# Patient Record
Sex: MALE | Race: BLACK OR AFRICAN AMERICAN | NOT HISPANIC OR LATINO | URBAN - METROPOLITAN AREA
[De-identification: names, ages, dates, MRNs, and addresses within clinical notes are randomized per-mention and may not be internally consistent; named-entity substitution may affect disease eponyms.]

---

## 2017-09-24 ENCOUNTER — INPATIENT (INPATIENT)
Facility: HOSPITAL | Age: 82
LOS: 2 days | Discharge: NURSING, REHAB OR LONG-TERM CARE | DRG: 480 | End: 2017-09-27
Attending: ORTHOPAEDIC SURGERY | Admitting: ORTHOPAEDIC SURGERY
Payer: MEDICARE

## 2017-09-24 VITALS
HEART RATE: 53 BPM | RESPIRATION RATE: 18 BRPM | SYSTOLIC BLOOD PRESSURE: 109 MMHG | OXYGEN SATURATION: 100 % | DIASTOLIC BLOOD PRESSURE: 61 MMHG | TEMPERATURE: 97 F

## 2017-09-24 DIAGNOSIS — Z01.818 ENCOUNTER FOR OTHER PREPROCEDURAL EXAMINATION: ICD-10-CM

## 2017-09-24 LAB
ALBUMIN SERPL ELPH-MCNC: 3.9 G/DL — SIGNIFICANT CHANGE UP (ref 3.3–5)
ALP SERPL-CCNC: 102 U/L — SIGNIFICANT CHANGE UP (ref 40–120)
ALT FLD-CCNC: 12 U/L — SIGNIFICANT CHANGE UP (ref 10–45)
ANION GAP SERPL CALC-SCNC: 15 MMOL/L — SIGNIFICANT CHANGE UP (ref 5–17)
APPEARANCE UR: CLEAR — SIGNIFICANT CHANGE UP
APTT BLD: 29.9 SEC — SIGNIFICANT CHANGE UP (ref 27.5–37.4)
AST SERPL-CCNC: 20 U/L — SIGNIFICANT CHANGE UP (ref 10–40)
BASE EXCESS BLDA CALC-SCNC: -1.4 MMOL/L — SIGNIFICANT CHANGE UP (ref -2–3)
BASOPHILS NFR BLD AUTO: 0.1 % — SIGNIFICANT CHANGE UP (ref 0–2)
BILIRUB SERPL-MCNC: 0.4 MG/DL — SIGNIFICANT CHANGE UP (ref 0.2–1.2)
BILIRUB UR-MCNC: NEGATIVE — SIGNIFICANT CHANGE UP
BLD GP AB SCN SERPL QL: NEGATIVE — SIGNIFICANT CHANGE UP
BUN SERPL-MCNC: 23 MG/DL — SIGNIFICANT CHANGE UP (ref 7–23)
CA-I BLDA-SCNC: 1.15 MMOL/L — SIGNIFICANT CHANGE UP (ref 1.12–1.3)
CALCIUM SERPL-MCNC: 9.5 MG/DL — SIGNIFICANT CHANGE UP (ref 8.4–10.5)
CHLORIDE SERPL-SCNC: 102 MMOL/L — SIGNIFICANT CHANGE UP (ref 96–108)
CO2 SERPL-SCNC: 22 MMOL/L — SIGNIFICANT CHANGE UP (ref 22–31)
COHGB MFR BLDA: 0.3 % — SIGNIFICANT CHANGE UP
COLOR SPEC: YELLOW — SIGNIFICANT CHANGE UP
CREAT SERPL-MCNC: 0.97 MG/DL — SIGNIFICANT CHANGE UP (ref 0.5–1.3)
DIFF PNL FLD: NEGATIVE — SIGNIFICANT CHANGE UP
EOSINOPHIL NFR BLD AUTO: 1.6 % — SIGNIFICANT CHANGE UP (ref 0–6)
GAS PNL BLDA: SIGNIFICANT CHANGE UP
GLUCOSE SERPL-MCNC: 142 MG/DL — HIGH (ref 70–99)
GLUCOSE UR QL: NEGATIVE — SIGNIFICANT CHANGE UP
HCO3 BLDA-SCNC: 23 MMOL/L — SIGNIFICANT CHANGE UP (ref 21–28)
HCT VFR BLD CALC: 32.5 % — LOW (ref 39–50)
HGB BLD-MCNC: 10.6 G/DL — LOW (ref 13–17)
HGB BLDA-MCNC: 10.6 G/DL — LOW (ref 13–17)
INR BLD: 1.12 — SIGNIFICANT CHANGE UP (ref 0.88–1.16)
KETONES UR-MCNC: (no result) MG/DL
LEUKOCYTE ESTERASE UR-ACNC: NEGATIVE — SIGNIFICANT CHANGE UP
LYMPHOCYTES # BLD AUTO: 13.6 % — SIGNIFICANT CHANGE UP (ref 13–44)
MCHC RBC-ENTMCNC: 29.1 PG — SIGNIFICANT CHANGE UP (ref 27–34)
MCHC RBC-ENTMCNC: 32.6 G/DL — SIGNIFICANT CHANGE UP (ref 32–36)
MCV RBC AUTO: 89.3 FL — SIGNIFICANT CHANGE UP (ref 80–100)
METHGB MFR BLDA: 0.2 % — SIGNIFICANT CHANGE UP
MONOCYTES NFR BLD AUTO: 11.2 % — SIGNIFICANT CHANGE UP (ref 2–14)
MRSA PCR RESULT.: NEGATIVE — SIGNIFICANT CHANGE UP
NEUTROPHILS NFR BLD AUTO: 73.5 % — SIGNIFICANT CHANGE UP (ref 43–77)
NITRITE UR-MCNC: NEGATIVE — SIGNIFICANT CHANGE UP
O2 CT VFR BLDA CALC: SIGNIFICANT CHANGE UP (ref 15–23)
OXYHGB MFR BLDA: 100 % — SIGNIFICANT CHANGE UP (ref 94–100)
PCO2 BLDA: 37 MMHG — SIGNIFICANT CHANGE UP (ref 35–48)
PH BLDA: 7.41 — SIGNIFICANT CHANGE UP (ref 7.35–7.45)
PH UR: 6 — SIGNIFICANT CHANGE UP (ref 5–8)
PLATELET # BLD AUTO: 173 K/UL — SIGNIFICANT CHANGE UP (ref 150–400)
PO2 BLDA: 441 MMHG — HIGH (ref 83–108)
POTASSIUM BLDA-SCNC: 4.2 MMOL/L — SIGNIFICANT CHANGE UP (ref 3.5–4.9)
POTASSIUM SERPL-MCNC: 4.6 MMOL/L — SIGNIFICANT CHANGE UP (ref 3.5–5.3)
POTASSIUM SERPL-SCNC: 4.6 MMOL/L — SIGNIFICANT CHANGE UP (ref 3.5–5.3)
PROT SERPL-MCNC: 7.7 G/DL — SIGNIFICANT CHANGE UP (ref 6–8.3)
PROT UR-MCNC: NEGATIVE MG/DL — SIGNIFICANT CHANGE UP
PROTHROM AB SERPL-ACNC: 12.5 SEC — SIGNIFICANT CHANGE UP (ref 9.8–12.7)
RBC # BLD: 3.64 M/UL — LOW (ref 4.2–5.8)
RBC # FLD: 14.1 % — SIGNIFICANT CHANGE UP (ref 10.3–16.9)
RH IG SCN BLD-IMP: POSITIVE — SIGNIFICANT CHANGE UP
S AUREUS DNA NOSE QL NAA+PROBE: NEGATIVE — SIGNIFICANT CHANGE UP
SAO2 % BLDA: 100 % — SIGNIFICANT CHANGE UP (ref 95–100)
SODIUM BLDA-SCNC: 138 MMOL/L — SIGNIFICANT CHANGE UP (ref 138–146)
SODIUM SERPL-SCNC: 139 MMOL/L — SIGNIFICANT CHANGE UP (ref 135–145)
SP GR SPEC: 1.01 — SIGNIFICANT CHANGE UP (ref 1–1.03)
UROBILINOGEN FLD QL: 0.2 E.U./DL — SIGNIFICANT CHANGE UP
WBC # BLD: 7.6 K/UL — SIGNIFICANT CHANGE UP (ref 3.8–10.5)
WBC # FLD AUTO: 7.6 K/UL — SIGNIFICANT CHANGE UP (ref 3.8–10.5)

## 2017-09-24 PROCEDURE — 93010 ELECTROCARDIOGRAM REPORT: CPT

## 2017-09-24 PROCEDURE — 99222 1ST HOSP IP/OBS MODERATE 55: CPT

## 2017-09-24 PROCEDURE — 73552 X-RAY EXAM OF FEMUR 2/>: CPT | Mod: 26,76,RT

## 2017-09-24 PROCEDURE — 71010: CPT | Mod: 26

## 2017-09-24 PROCEDURE — 73503 X-RAY EXAM HIP UNI 4/> VIEWS: CPT | Mod: 26,RT

## 2017-09-24 PROCEDURE — 99285 EMERGENCY DEPT VISIT HI MDM: CPT | Mod: 25

## 2017-09-24 RX ORDER — SODIUM CHLORIDE 9 MG/ML
1000 INJECTION INTRAMUSCULAR; INTRAVENOUS; SUBCUTANEOUS ONCE
Qty: 0 | Refills: 0 | Status: COMPLETED | OUTPATIENT
Start: 2017-09-24 | End: 2017-09-24

## 2017-09-24 RX ORDER — SODIUM CHLORIDE 9 MG/ML
1000 INJECTION, SOLUTION INTRAVENOUS
Qty: 0 | Refills: 0 | Status: DISCONTINUED | OUTPATIENT
Start: 2017-09-24 | End: 2017-09-27

## 2017-09-24 RX ORDER — OXYCODONE HYDROCHLORIDE 5 MG/1
5 TABLET ORAL EVERY 4 HOURS
Qty: 0 | Refills: 0 | Status: DISCONTINUED | OUTPATIENT
Start: 2017-09-24 | End: 2017-09-26

## 2017-09-24 RX ORDER — DOCUSATE SODIUM 100 MG
100 CAPSULE ORAL THREE TIMES A DAY
Qty: 0 | Refills: 0 | Status: DISCONTINUED | OUTPATIENT
Start: 2017-09-24 | End: 2017-09-27

## 2017-09-24 RX ORDER — ATENOLOL 25 MG/1
50 TABLET ORAL DAILY
Qty: 0 | Refills: 0 | Status: DISCONTINUED | OUTPATIENT
Start: 2017-09-24 | End: 2017-09-27

## 2017-09-24 RX ORDER — OXYCODONE HYDROCHLORIDE 5 MG/1
10 TABLET ORAL EVERY 4 HOURS
Qty: 0 | Refills: 0 | Status: DISCONTINUED | OUTPATIENT
Start: 2017-09-24 | End: 2017-09-26

## 2017-09-24 RX ORDER — SENNA PLUS 8.6 MG/1
2 TABLET ORAL AT BEDTIME
Qty: 0 | Refills: 0 | Status: DISCONTINUED | OUTPATIENT
Start: 2017-09-24 | End: 2017-09-27

## 2017-09-24 RX ORDER — ACETAMINOPHEN 500 MG
650 TABLET ORAL EVERY 6 HOURS
Qty: 0 | Refills: 0 | Status: DISCONTINUED | OUTPATIENT
Start: 2017-09-24 | End: 2017-09-27

## 2017-09-24 RX ORDER — CEFAZOLIN SODIUM 1 G
2000 VIAL (EA) INJECTION EVERY 8 HOURS
Qty: 0 | Refills: 0 | Status: COMPLETED | OUTPATIENT
Start: 2017-09-25 | End: 2017-09-25

## 2017-09-24 RX ORDER — INFLUENZA VIRUS VACCINE 15; 15; 15; 15 UG/.5ML; UG/.5ML; UG/.5ML; UG/.5ML
0.5 SUSPENSION INTRAMUSCULAR ONCE
Qty: 0 | Refills: 0 | Status: DISCONTINUED | OUTPATIENT
Start: 2017-09-24 | End: 2017-09-27

## 2017-09-24 RX ORDER — DOCUSATE SODIUM 100 MG
100 CAPSULE ORAL THREE TIMES A DAY
Qty: 0 | Refills: 0 | Status: DISCONTINUED | OUTPATIENT
Start: 2017-09-24 | End: 2017-09-24

## 2017-09-24 RX ORDER — ONDANSETRON 8 MG/1
4 TABLET, FILM COATED ORAL EVERY 6 HOURS
Qty: 0 | Refills: 0 | Status: DISCONTINUED | OUTPATIENT
Start: 2017-09-24 | End: 2017-09-27

## 2017-09-24 RX ORDER — POLYETHYLENE GLYCOL 3350 17 G/17G
17 POWDER, FOR SOLUTION ORAL DAILY
Qty: 0 | Refills: 0 | Status: DISCONTINUED | OUTPATIENT
Start: 2017-09-24 | End: 2017-09-27

## 2017-09-24 RX ORDER — ASPIRIN/CALCIUM CARB/MAGNESIUM 324 MG
325 TABLET ORAL
Qty: 0 | Refills: 0 | Status: DISCONTINUED | OUTPATIENT
Start: 2017-09-24 | End: 2017-09-27

## 2017-09-24 RX ORDER — FLUOXETINE HCL 10 MG
20 CAPSULE ORAL DAILY
Qty: 0 | Refills: 0 | Status: DISCONTINUED | OUTPATIENT
Start: 2017-09-24 | End: 2017-09-27

## 2017-09-24 RX ORDER — FERROUS SULFATE 325(65) MG
325 TABLET ORAL
Qty: 0 | Refills: 0 | Status: DISCONTINUED | OUTPATIENT
Start: 2017-09-24 | End: 2017-09-27

## 2017-09-24 RX ORDER — MAGNESIUM HYDROXIDE 400 MG/1
30 TABLET, CHEWABLE ORAL DAILY
Qty: 0 | Refills: 0 | Status: DISCONTINUED | OUTPATIENT
Start: 2017-09-24 | End: 2017-09-27

## 2017-09-24 RX ORDER — INFLUENZA VIRUS VACCINE 15; 15; 15; 15 UG/.5ML; UG/.5ML; UG/.5ML; UG/.5ML
0.5 SUSPENSION INTRAMUSCULAR ONCE
Qty: 0 | Refills: 0 | Status: DISCONTINUED | OUTPATIENT
Start: 2017-09-24 | End: 2017-09-24

## 2017-09-24 RX ORDER — MORPHINE SULFATE 50 MG/1
2 CAPSULE, EXTENDED RELEASE ORAL ONCE
Qty: 0 | Refills: 0 | Status: DISCONTINUED | OUTPATIENT
Start: 2017-09-24 | End: 2017-09-24

## 2017-09-24 RX ORDER — SODIUM CHLORIDE 9 MG/ML
1000 INJECTION, SOLUTION INTRAVENOUS
Qty: 0 | Refills: 0 | Status: DISCONTINUED | OUTPATIENT
Start: 2017-09-24 | End: 2017-09-24

## 2017-09-24 RX ORDER — MORPHINE SULFATE 50 MG/1
4 CAPSULE, EXTENDED RELEASE ORAL EVERY 4 HOURS
Qty: 0 | Refills: 0 | Status: DISCONTINUED | OUTPATIENT
Start: 2017-09-24 | End: 2017-09-26

## 2017-09-24 RX ORDER — ACETAMINOPHEN 500 MG
650 TABLET ORAL EVERY 6 HOURS
Qty: 0 | Refills: 0 | Status: DISCONTINUED | OUTPATIENT
Start: 2017-09-24 | End: 2017-09-24

## 2017-09-24 RX ADMIN — MORPHINE SULFATE 2 MILLIGRAM(S): 50 CAPSULE, EXTENDED RELEASE ORAL at 08:36

## 2017-09-24 RX ADMIN — SODIUM CHLORIDE 1000 MILLILITER(S): 9 INJECTION INTRAMUSCULAR; INTRAVENOUS; SUBCUTANEOUS at 08:35

## 2017-09-24 NOTE — ED ADULT NURSE NOTE - CHPI ED SYMPTOMS NEG
no loss of consciousness/no bleeding/no abrasion/no confusion/no tingling/no weakness/no deformity/no fever/no vomiting/no numbness

## 2017-09-24 NOTE — H&P ADULT - HISTORY OF PRESENT ILLNESS
87M PMH dementia and HTN BIBA from [facility] with R hip pain after being found down. Staff did not witness fall but reports he could have fallen out of bed onto R side, while pt does not remember falling. C/o R hip and knee pain. Unable to walk/bear weight.  Denies other trauma, loc, numbness/weakness.

## 2017-09-24 NOTE — CONSULT NOTE ADULT - PROBLEM SELECTOR RECOMMENDATION 9
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  No ischemic changes  - No CP/SOB, CXR clear lungs  - No known CAD per HCP  - RCRI score 0.    - no further cardiac workup needed at this point prior to moderate risk urgent surgery   - case d/w Dr. Villanueva

## 2017-09-24 NOTE — ED PROVIDER NOTE - MEDICAL DECISION MAKING DETAILS
pt with hip fracture s/p mechanical fall, no evidence of other injury, preop labs in ED and pt admitted to ortho

## 2017-09-24 NOTE — CONSULT NOTE ADULT - ATTENDING COMMENTS
Agree w Consult Note, Referral/Consultation, Subjective and Objective, Assessment and Recommendation

## 2017-09-24 NOTE — ED PROVIDER NOTE - ATTESTATION, MLM
I have reviewed and confirmed nurses' notes for patient's medications, allergies, medical history, and surgical history.
Improved

## 2017-09-24 NOTE — ED PROVIDER NOTE - OBJECTIVE STATEMENT
86 y/o m with PMH of dementia, HTN presents to ED from [facility] for fall.  As per tx sheet, pt found at bedside this morning and possibly fell out of bed.  Pt states he was walking and fell.  However, pt with hx of dementia and unreliable.  Pt complaining of right hip pain only.  No head trauma or other complaints.  Unable to ambulate.

## 2017-09-24 NOTE — BRIEF OPERATIVE NOTE - PROCEDURE
<<-----Click on this checkbox to enter Procedure Intramedullary nailing for fracture of right femur  09/24/2017    Active  NATASHA

## 2017-09-24 NOTE — PACU DISCHARGE NOTE - COMMENTS
Report given to Lizet LAMA VSS,. Dressing dry and intact to hip and calf. Xray done. Pt resting comfortably.

## 2017-09-24 NOTE — H&P ADULT - NSHPPHYSICALEXAM_GEN_ALL_CORE
NAD, lying comfortably in stretcher, AOx1 (unsure of time/place)  RLE: shortened, externally rotated, +logroll, TTP over greater troch  Motor: 5/5 EHL/FHL/GS/TA  Sensory: SILT  Pulses wwp DP/PT 2+

## 2017-09-24 NOTE — ED ADULT NURSE NOTE - OBJECTIVE STATEMENT
s/p fall while walking, c/o right hip/knee pain, denies LOC, states he does not use walker/cane, states he was walking alone on the sidewalk and fell, unk reason BIBA from [facility], s/p fall while walking, c/o right hip/knee pain, denies LOC, states he does not use walker/cane, states he was walking outside the hosp/home, alone on the sidewalk and fell, unk reason, denies dizziness

## 2017-09-24 NOTE — ED PROVIDER NOTE - MUSCULOSKELETAL, MLM
right lower ext - contracted, shortened, rotated, no knee or ankle tenderness, no distal nv deficit, left lower ext wnl

## 2017-09-24 NOTE — ED ADULT NURSE REASSESSMENT NOTE - NS ED NURSE REASSESS COMMENT FT1
Received patient at the change of shift, patient awake, alert to self and place. Patient complaining of right hip pain. Pending xray.

## 2017-09-24 NOTE — H&P ADULT - ASSESSMENT
A/P  Pt 87yMale  s/p fall with R IT fx  Admit to Orthopaedics  NPO   IVF  Pain control  Hold anticoagulation for OR  Labs  UA  type and screen  CXR   EKG  Clearance with Dr. Villanueva  Consented for R IMN over phone with HCP (daughter, Evelyn Vital- [phone]) 2 physician consent (Dr. Santo)  d/w attending

## 2017-09-24 NOTE — H&P ADULT - NSHPLABSRESULTS_GEN_ALL_CORE
Vital Signs Last 24 Hrs  T(C): 36.2 (24 Sep 2017 10:45), Max: 36.6 (24 Sep 2017 08:40)  T(F): 97.2 (24 Sep 2017 10:45), Max: 97.9 (24 Sep 2017 08:40)  HR: 64 (24 Sep 2017 10:45) (53 - 64)  BP: 122/66 (24 Sep 2017 10:45) (101/62 - 122/66)  BP(mean): --  RR: 18 (24 Sep 2017 10:45) (16 - 18)  SpO2: 98% (24 Sep 2017 10:45) (97% - 100%)                          10.6   7.6   )-----------( 173      ( 24 Sep 2017 08:06 )             32.5     09-24    139  |  102  |  23  ----------------------------<  142<H>  4.6   |  22  |  0.97    Ca    9.5      24 Sep 2017 08:06    TPro  7.7  /  Alb  3.9  /  TBili  0.4  /  DBili  x   /  AST  20  /  ALT  12  /  AlkPhos  102  09-24    PT/INR - ( 24 Sep 2017 08:06 )   PT: 12.5 sec;   INR: 1.12          PTT - ( 24 Sep 2017 08:06 )  PTT:29.9 sec    Type + Screen (09.24.17 @ 07:58)    ABO Interpretation: A    Rh Interpretation: Positive    Antibody Screen: Negative

## 2017-09-24 NOTE — PATIENT PROFILE ADULT. - NS PRO INDICAT FLU
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

## 2017-09-24 NOTE — ED ADULT TRIAGE NOTE - ARRIVAL INFO ADDITIONAL COMMENTS
as per ems, pt was found on the floor in nursing home. pt c.o right hip and right knee pain. pt denies any numbness or tingling to right leg. denies any headache, denies any loc. hx of dementia. as per ems, no head injury or loc. as per ems, pt was found on the floor in nursing home. pt c.o right hip and right knee pain. pts right knee noted to be bent, refused to straighten out leg due to pain. pt denies any numbness or tingling to right leg. denies any headache, denies any loc. hx of dementia. as per ems, no head injury or loc.

## 2017-09-25 LAB
ANION GAP SERPL CALC-SCNC: 11 MMOL/L — SIGNIFICANT CHANGE UP (ref 5–17)
BASOPHILS NFR BLD AUTO: 0.1 % — SIGNIFICANT CHANGE UP (ref 0–2)
BLD GP AB SCN SERPL QL: NEGATIVE — SIGNIFICANT CHANGE UP
BUN SERPL-MCNC: 20 MG/DL — SIGNIFICANT CHANGE UP (ref 7–23)
CALCIUM SERPL-MCNC: 8.7 MG/DL — SIGNIFICANT CHANGE UP (ref 8.4–10.5)
CHLORIDE SERPL-SCNC: 103 MMOL/L — SIGNIFICANT CHANGE UP (ref 96–108)
CO2 SERPL-SCNC: 25 MMOL/L — SIGNIFICANT CHANGE UP (ref 22–31)
CREAT SERPL-MCNC: 0.95 MG/DL — SIGNIFICANT CHANGE UP (ref 0.5–1.3)
EOSINOPHIL NFR BLD AUTO: 0.2 % — SIGNIFICANT CHANGE UP (ref 0–6)
GLUCOSE SERPL-MCNC: 148 MG/DL — HIGH (ref 70–99)
HCT VFR BLD CALC: 21.9 % — LOW (ref 39–50)
HGB BLD-MCNC: 7.2 G/DL — LOW (ref 13–17)
LYMPHOCYTES # BLD AUTO: 10.6 % — LOW (ref 13–44)
MCHC RBC-ENTMCNC: 30 PG — SIGNIFICANT CHANGE UP (ref 27–34)
MCHC RBC-ENTMCNC: 32.9 G/DL — SIGNIFICANT CHANGE UP (ref 32–36)
MCV RBC AUTO: 91.3 FL — SIGNIFICANT CHANGE UP (ref 80–100)
MONOCYTES NFR BLD AUTO: 17.3 % — HIGH (ref 2–14)
NEUTROPHILS NFR BLD AUTO: 71.8 % — SIGNIFICANT CHANGE UP (ref 43–77)
PLATELET # BLD AUTO: 139 K/UL — LOW (ref 150–400)
POTASSIUM SERPL-MCNC: 5 MMOL/L — SIGNIFICANT CHANGE UP (ref 3.5–5.3)
POTASSIUM SERPL-SCNC: 5 MMOL/L — SIGNIFICANT CHANGE UP (ref 3.5–5.3)
RBC # BLD: 2.4 M/UL — LOW (ref 4.2–5.8)
RBC # FLD: 14 % — SIGNIFICANT CHANGE UP (ref 10.3–16.9)
RH IG SCN BLD-IMP: POSITIVE — SIGNIFICANT CHANGE UP
SODIUM SERPL-SCNC: 139 MMOL/L — SIGNIFICANT CHANGE UP (ref 135–145)
WBC # BLD: 10.1 K/UL — SIGNIFICANT CHANGE UP (ref 3.8–10.5)
WBC # FLD AUTO: 10.1 K/UL — SIGNIFICANT CHANGE UP (ref 3.8–10.5)

## 2017-09-25 PROCEDURE — 99232 SBSQ HOSP IP/OBS MODERATE 35: CPT

## 2017-09-25 RX ADMIN — Medication 325 MILLIGRAM(S): at 17:00

## 2017-09-25 RX ADMIN — Medication 100 MILLIGRAM(S): at 05:45

## 2017-09-25 RX ADMIN — OXYCODONE HYDROCHLORIDE 5 MILLIGRAM(S): 5 TABLET ORAL at 06:45

## 2017-09-25 RX ADMIN — ATENOLOL 50 MILLIGRAM(S): 25 TABLET ORAL at 05:44

## 2017-09-25 RX ADMIN — OXYCODONE HYDROCHLORIDE 5 MILLIGRAM(S): 5 TABLET ORAL at 17:00

## 2017-09-25 RX ADMIN — OXYCODONE HYDROCHLORIDE 5 MILLIGRAM(S): 5 TABLET ORAL at 06:04

## 2017-09-25 RX ADMIN — Medication 325 MILLIGRAM(S): at 05:45

## 2017-09-25 RX ADMIN — Medication 100 MILLIGRAM(S): at 21:32

## 2017-09-25 RX ADMIN — Medication 650 MILLIGRAM(S): at 17:00

## 2017-09-25 RX ADMIN — Medication 650 MILLIGRAM(S): at 05:45

## 2017-09-25 RX ADMIN — Medication 325 MILLIGRAM(S): at 07:34

## 2017-09-25 RX ADMIN — Medication 100 MILLIGRAM(S): at 04:47

## 2017-09-25 RX ADMIN — Medication 325 MILLIGRAM(S): at 11:59

## 2017-09-25 RX ADMIN — POLYETHYLENE GLYCOL 3350 17 GRAM(S): 17 POWDER, FOR SOLUTION ORAL at 12:00

## 2017-09-25 RX ADMIN — Medication 20 MILLIGRAM(S): at 12:00

## 2017-09-25 RX ADMIN — Medication 100 MILLIGRAM(S): at 12:10

## 2017-09-25 RX ADMIN — Medication 650 MILLIGRAM(S): at 11:59

## 2017-09-25 NOTE — CHART NOTE - NSCHARTNOTEFT_GEN_A_CORE
Upon Nutritional Assessment by the Registered Dietitian your patient was determined to meet criteria / has evidence of the following diagnosis/diagnoses:          [ ]  Mild Protein Calorie Malnutrition        [ ]  Moderate Protein Calorie Malnutrition        [X] Severe Protein Calorie Malnutrition        [ ] Unspecified Protein Calorie Malnutrition        [ ] Underweight / BMI <19        [ ] Morbid Obesity / BMI > 40      Findings as based on:  •  Comprehensive nutrition assessment and consultation  •  Calorie counts (nutrient intake analysis)  •  Food acceptance and intake status from observations by staff  •  Follow up  •  Patient education  •  Intervention secondary to interdisciplinary rounds  •   concerns      Treatment:    The following diet has been recommended:  Mechanical soft diet + ensure enlive TID  MVI    PROVIDER Section:     By signing this assessment you are acknowledging and agree with the diagnosis/diagnoses assigned by the Registered Dietitian    Comments:

## 2017-09-25 NOTE — PHYSICAL THERAPY INITIAL EVALUATION ADULT - ADDITIONAL COMMENTS
Prior level of function is unclear at this time. Pt is from skilled nursing facility, where he has been a resident for ~ 3months. Pt states he has been ambulating but pt is a poor historian due to dementia.

## 2017-09-25 NOTE — DIETITIAN INITIAL EVALUATION ADULT. - ENERGY NEEDS
IBW 80.9Kg  %IBW 78%  BMI 19    Utilized IBW to calculate needs, <80% of IBW. Adjusting for post-op and need for repletion.

## 2017-09-25 NOTE — PROGRESS NOTE ADULT - SUBJECTIVE AND OBJECTIVE BOX
ORTHO NOTE    [x ] Pt seen/examined.  [x ] Pt without any complaints/in NAD.    [ ] Pt complains of:      ROS: [ ] Fever  [ ] Chills  [ ] CP [ ] SOB [ ] Dysnea  [ ] Palpitations [ ] Cough [ ] N/V/C/D [ ] Paresthia [ ] Other     [x ] ROS  otherwise negative    .    PHYSICAL EXAM:    Vital Signs Last 24 Hrs  T(C): 37.1 (25 Sep 2017 15:18), Max: 37.1 (24 Sep 2017 22:40)  T(F): 98.8 (25 Sep 2017 15:18), Max: 98.8 (25 Sep 2017 15:18)  HR: 72 (25 Sep 2017 15:30) (72 - 98)  BP: 120/74 (25 Sep 2017 15:30) (96/55 - 131/62)  BP(mean): 76 (24 Sep 2017 23:00) (72 - 100)  RR: 15 (25 Sep 2017 15:18) (12 - 24)  SpO2: 98% (25 Sep 2017 15:18) (95% - 100%)    I&O's Detail    24 Sep 2017 07:01  -  25 Sep 2017 07:00  --------------------------------------------------------  IN:    IV PiggyBack: 50 mL    Lactated Ringers IV Bolus: 2000 mL    lactated ringers.: 900 mL  Total IN: 2950 mL    OUT:    Estimated Blood Loss: 300 mL    Indwelling Catheter - Urethral: 900 mL    Voided: 75 mL  Total OUT: 1275 mL    Total NET: 1675 mL      25 Sep 2017 07:01  -  25 Sep 2017 15:42  --------------------------------------------------------  IN:  Total IN: 0 mL    OUT:    Voided: 375 mL  Total OUT: 375 mL    Total NET: -375 mL           CAPILLARY BLOOD GLUCOSE                      Neuro: AAOx1, reoriented to place and time    Lungs: CTA, CXR clear    CV:     ABD: soft, nontender     Ext: right hip dsg cdi, R LE NVID strength 5/5    LABS                        7.2    10.1  )-----------( 139      ( 25 Sep 2017 08:48 )             21.9                              PT/INR - ( 24 Sep 2017 08:06 )   PT: 12.5 sec;   INR: 1.12          PTT - ( 24 Sep 2017 08:06 )  PTT:29.9 sec  09-25    139  |  103  |  20  ----------------------------<  148<H>  5.0   |  25  |  0.95    Ca    8.7      25 Sep 2017 08:48    TPro  7.7  /  Alb  3.9  /  TBili  0.4  /  DBili  x   /  AST  20  /  ALT  12  /  AlkPhos  102  09-24      [ ] Other Labs  [ ] None ordered            Please check or [place] when present:  •  Heart Failure:    [ ] Acute        [ ]  Acute on Chronic        [ ] Chronic         [ ] Diastolic     [ ]  Combined    •  CONNER:     [ ] ATN        [ ]  Renal medullary necrosis       [ ]  Renal cortical necrosis                  [ ] Other pathological Lesion:  •  CKD:  [ ] Stage I   [ ] Stage II  [ ] Stage III    [ ]Stage IV   [ ]  CKD V   [ ]  Other/Unspecified:    •  Abdominal Nutritional Status:   [ ] Malnutrition-See Nutrition note    [ ] Cachexia   [ ]  Other        [ ] Supplement ordered:            [ ] Morbid Obesity: BMI >=40         ASSESSMENT/PLAN:      STATUS POST: R hip IM Nail pod1  H/H 7.2/21.9. d/w cards Dr. Villanueva and Dr. Barnard. 1UPRB for acute blood loss anemia secondary to surgery  CONTINUE:          [ ] PT- WBAT    [ ] DVT PPX- scd, ASA bid    [ ] Pain Mgt- po meds (limit narcotics)     [ ] Dispo plan- [facility] (patient lives at [facility])

## 2017-09-25 NOTE — PROGRESS NOTE ADULT - SUBJECTIVE AND OBJECTIVE BOX
Patient seen and examined at bedside.      S:  Required glynn overnight.   Pain tolerable.  Remains in bed.  Denies CP/SOB. Tolerating PO.  ROS unremarkable.    O: T(C): 36.8 (09-25-17 @ 08:39), Max: 37.1 (09-24-17 @ 13:15)  HR: 73 (09-25-17 @ 08:39) (72 - 98)  BP: 98/58 (09-25-17 @ 08:39) (96/55 - 131/62)  RR: 17 (09-25-17 @ 08:39) (12 - 24)  SpO2: 96% (09-25-17 @ 08:39) (95% - 100%)  Wt(kg): --    NAD, AOx3, non-labored respirations  Abd soft, NTND  Dressing CDI  Extremity soft, appropriate swelling and minimally TTP  foot warm and well perfused  SILT dP, sP, Sa, Aquino, T  firing TA/EHL/GS                          7.2    10.1  )-----------( 139      ( 25 Sep 2017 08:48 )             21.9       I&O's Detail    24 Sep 2017 07:01  -  25 Sep 2017 07:00  --------------------------------------------------------  IN:    IV PiggyBack: 50 mL    Lactated Ringers IV Bolus: 2000 mL    lactated ringers.: 900 mL  Total IN: 2950 mL    OUT:    Estimated Blood Loss: 300 mL    Indwelling Catheter - Urethral: 900 mL    Voided: 75 mL  Total OUT: 1275 mL    Total NET: 1675 mL            A/P: 87y Male s/p R hip ORIF, stable    - analgesia with bowel regimen  - WBAT with PT  - OOB ad julieta  - DVT ppx: aspirin enteric coated 325 milliGRAM(s) Oral two times a day  - ADAT  - f/u labs  - Dispo planning

## 2017-09-25 NOTE — DIETITIAN INITIAL EVALUATION ADULT. - OTHER INFO
88y/o M s/p R IMN. Pt seen resting in bed. He reports a good appetite and intake; recalls consuming 75% of breakfast today. Denies N/V/D/C, pain, and mechanical issues. Last BM yesterday per pt. Per d/w RN, pt without swallowing difficulty, but requires assisted feeds. No s/s of aspiration on current diet. Recommend to continue with assisted feeds and to monitor s/s of aspiration. PTA pt reports eating well and denies wt loss. However, states # which would indicate 40# wt loss. 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 Fat wasting noted to triceps, orbital region, and buccal region. Muscle wasting noted to bitemporal region, clavicular region, quads, and gastrocnemius. Continue with MVI and recommend ONS. Will follow. 86y/o M s/p R IMN. Pt seen resting in bed. He reports a good appetite and intake; recalls consuming 75% of breakfast today. Denies N/V/D/C, pain, and mechanical issues. Last BM yesterday per pt. Per d/w RN, pt without swallowing difficulty, but requires assisted feeds. SLP (9/25) recommends mechanical soft diet; change diet to meet appropriate consistency. Recommend to continue with assisted feeds and to monitor s/s of aspiration. PTA pt reports eating well and denies wt loss. However, states # which would indicate 40# wt loss. 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 Fat wasting noted to triceps, orbital region, and buccal region. Muscle wasting noted to bitemporal region, clavicular region, quads, and gastrocnemius. Continue with MVI and recommend ONS. Will follow.

## 2017-09-25 NOTE — DIETITIAN INITIAL EVALUATION ADULT. - PERTINENT MEDS FT
aspirin, LR, dulcolax, colace, ferrous sulfate, mag hydroxide, morphine, MVI, zofran, miralax, senna

## 2017-09-25 NOTE — PROGRESS NOTE ADULT - SUBJECTIVE AND OBJECTIVE BOX
Orthopaedics Post Op Check    Procedure: R IMN  Surgeon: Evon    Pt comfortable, c/o pain  Denies CP, SOB, N/V, numbness/tingling     Vital Signs Last 24 Hrs  T(C): 36.4 (25 Sep 2017 04:43), Max: 37.4 (24 Sep 2017 12:07)  T(F): 97.6 (25 Sep 2017 04:43), Max: 99.4 (24 Sep 2017 12:07)  HR: 87 (25 Sep 2017 04:43) (53 - 98)  BP: 106/68 (25 Sep 2017 04:43) (96/55 - 131/62)  BP(mean): 76 (24 Sep 2017 23:00) (72 - 100)  RR: 15 (25 Sep 2017 04:43) (12 - 24)  SpO2: 100% (25 Sep 2017 04:43) (95% - 100%)  AVSS, NAD    Dressing C/D/I  General: Pt Alert but disoriented     Pulses: WWP, DP/PT 2+  Sensation: SILT  Motor: moving foot spontaneously                          10.6   7.6   )-----------( 173      ( 24 Sep 2017 08:06 )             32.5   09-24    139  |  102  |  23  ----------------------------<  142<H>  4.6   |  22  |  0.97    Ca    9.5      24 Sep 2017 08:06    TPro  7.7  /  Alb  3.9  /  TBili  0.4  /  DBili  x   /  AST  20  /  ALT  12  /  AlkPhos  102  09-24    Post op XR: no fracture or foreign body    A/P: 87yMale POD#0 s/p above procedure  - Stable  - Pain Control  - DVT ppx: ASA  - Post op abx: Ancef  - PT, WBS: WBAT  - F/U AM Labs    Markus Grady MD, PGY-2  [phone]

## 2017-09-25 NOTE — SWALLOW BEDSIDE ASSESSMENT ADULT - NS SPL SWALLOW CLINIC TRIAL FT
Oral stage is significant for prolonged bolus formation, manipulation and transport. Pt benefited from a liquid wash to clear oral residue. There were no overt signs of airway protection deficits during this exam.

## 2017-09-25 NOTE — SWALLOW BEDSIDE ASSESSMENT ADULT - ASR SWALLOW ASPIRATION MONITOR
fever/pneumonia/throat clearing/upper respiratory infection/oral hygiene/position upright (90Y)/change of breathing pattern/cough/gurgly voice

## 2017-09-25 NOTE — PROGRESS NOTE ADULT - SUBJECTIVE AND OBJECTIVE BOX
Chief Complaint/Reason for Consult: periop cv mgmt  INTERVAL HPI: post op s complcations, anemic today no signs of cardiac decompensation   	  MEDICATIONS:  ATENolol  Tablet 50 milliGRAM(s) Oral daily        oxyCODONE    IR 10 milliGRAM(s) Oral every 4 hours PRN  oxyCODONE    IR 5 milliGRAM(s) Oral every 4 hours PRN  morphine  - Injectable 4 milliGRAM(s) IV Push every 4 hours PRN  FLUoxetine 20 milliGRAM(s) Oral daily  acetaminophen   Tablet 650 milliGRAM(s) Oral every 6 hours PRN  acetaminophen   Tablet 650 milliGRAM(s) Oral every 6 hours  ondansetron Injectable 4 milliGRAM(s) IV Push every 6 hours PRN    aluminum hydroxide/magnesium hydroxide/simethicone Suspension 30 milliLiter(s) Oral four times a day PRN  polyethylene glycol 3350 17 Gram(s) Oral daily  senna 2 Tablet(s) Oral at bedtime PRN  magnesium hydroxide Suspension 30 milliLiter(s) Oral daily PRN  docusate sodium 100 milliGRAM(s) Oral three times a day      influenza  Vaccine (HIGH DOSE) 0.5 milliLiter(s) IntraMuscular once  lactated ringers. 1000 milliLiter(s) IV Continuous <Continuous>  aspirin enteric coated 325 milliGRAM(s) Oral two times a day  ferrous    sulfate 325 milliGRAM(s) Oral three times a day with meals  multivitamin 1 Tablet(s) Oral daily      REVIEW OF SYSTEMS:  [x] As per HPI  CONSTITUTIONAL: No fever, weight loss, or fatigue  RESPIRATORY: No cough, wheezing, chills or hemoptysis; No Shortness of Breath  CARDIOVASCULAR: No chest pain, palpitations, dizziness, or leg swelling  GASTROINTESTINAL: No abdominal or epigastric pain. No nausea, vomiting, or hematemesis; No diarrhea or constipation. No melena or hematochezia.  MUSCULOSKELETAL: No joint pain or swelling; No muscle, back, or extremity pain  [x] All others negative	  [ ] Unable to obtain    PHYSICAL EXAM:  T(C): 37.1 (09-25-17 @ 15:18), Max: 37.1 (09-24-17 @ 22:40)  HR: 78 (09-25-17 @ 15:18) (73 - 98)  BP: 120/74 (09-25-17 @ 15:18) (96/55 - 131/62)  RR: 15 (09-25-17 @ 15:18) (12 - 24)  SpO2: 98% (09-25-17 @ 15:18) (95% - 100%)  Wt(kg): --  I&O's Summary    24 Sep 2017 07:01  -  25 Sep 2017 07:00  --------------------------------------------------------  IN: 2950 mL / OUT: 1275 mL / NET: 1675 mL    25 Sep 2017 07:01  -  25 Sep 2017 15:25  --------------------------------------------------------  IN: 0 mL / OUT: 375 mL / NET: -375 mL          Appearance: Normal	  HEENT:   Normal oral mucosa  Cardiovascular: Normal S1 S2, No JVD, No murmurs, No edema  Respiratory: Lungs clear to auscultation	  Gastrointestinal:  Soft, Non-tender, + BS	  Extremities: Normal range of motion, No clubbing, cyanosis or edema  Vascular: Peripheral pulses palpable 2+ bilaterally    TELEMETRY: 	    ECG:   	  RADIOLOGY:   CXR:  CT:  US:    CARDIAC TESTING:  Echocardiogram:  Catheterization:  Stress Test:      LABS:	 	    CARDIAC MARKERS:                                  7.2    10.1  )-----------( 139      ( 25 Sep 2017 08:48 )             21.9     09-25    139  |  103  |  20  ----------------------------<  148<H>  5.0   |  25  |  0.95    Ca    8.7      25 Sep 2017 08:48    TPro  7.7  /  Alb  3.9  /  TBili  0.4  /  DBili  x   /  AST  20  /  ALT  12  /  AlkPhos  102  09-24    proBNP:   Lipid Profile:   HgA1c:   TSH:     ASSESSMENT/PLAN: 	    # post op s complcations   recommedn 1 U PRBC to keep HgB >8  frequent lung checks.      #CV Prevention -   q3mo Fasting Lipid Profile, Goal LDL<100, statin as tolerated.  q6week TSH check  q3mo 25-OHD Vitamin D Level, Goal 50, supplement as tolerated

## 2017-09-25 NOTE — PHYSICAL THERAPY INITIAL EVALUATION ADULT - PLANNED THERAPY INTERVENTIONS, PT EVAL
manual therapy techniques/motor coordination training/neuromuscular re-education/balance training/bed mobility training/stretching/joint mobilization/strengthening/ROM/gait training/postural re-education/transfer training

## 2017-09-25 NOTE — PHYSICAL THERAPY INITIAL EVALUATION ADULT - IMPAIRMENTS FOUND, PT EVAL
gross motor/joint integrity and mobility/decreased midline orientation/ergonomics and body mechanics/gait, locomotion, and balance/muscle strength/posture/arousal, attention, and cognition/cognitive impairment/fine motor/aerobic capacity/endurance/ROM

## 2017-09-26 LAB
ANION GAP SERPL CALC-SCNC: 10 MMOL/L — SIGNIFICANT CHANGE UP (ref 5–17)
APPEARANCE UR: (no result)
BILIRUB UR-MCNC: NEGATIVE — SIGNIFICANT CHANGE UP
BUN SERPL-MCNC: 17 MG/DL — SIGNIFICANT CHANGE UP (ref 7–23)
CALCIUM SERPL-MCNC: 8.2 MG/DL — LOW (ref 8.4–10.5)
CHLORIDE SERPL-SCNC: 101 MMOL/L — SIGNIFICANT CHANGE UP (ref 96–108)
CO2 SERPL-SCNC: 25 MMOL/L — SIGNIFICANT CHANGE UP (ref 22–31)
COLOR SPEC: (no result)
CREAT SERPL-MCNC: 0.98 MG/DL — SIGNIFICANT CHANGE UP (ref 0.5–1.3)
DIFF PNL FLD: (no result)
GLUCOSE SERPL-MCNC: 119 MG/DL — HIGH (ref 70–99)
GLUCOSE UR QL: NEGATIVE — SIGNIFICANT CHANGE UP
HCT VFR BLD CALC: 21 % — CRITICAL LOW (ref 39–50)
HCT VFR BLD CALC: 21 % — CRITICAL LOW (ref 39–50)
HGB BLD-MCNC: 6.9 G/DL — CRITICAL LOW (ref 13–17)
HGB BLD-MCNC: 6.9 G/DL — CRITICAL LOW (ref 13–17)
KETONES UR-MCNC: (no result) MG/DL
LEUKOCYTE ESTERASE UR-ACNC: (no result)
MCHC RBC-ENTMCNC: 27.8 PG — SIGNIFICANT CHANGE UP (ref 27–34)
MCHC RBC-ENTMCNC: 29.5 PG — SIGNIFICANT CHANGE UP (ref 27–34)
MCHC RBC-ENTMCNC: 32.9 G/DL — SIGNIFICANT CHANGE UP (ref 32–36)
MCHC RBC-ENTMCNC: 32.9 G/DL — SIGNIFICANT CHANGE UP (ref 32–36)
MCV RBC AUTO: 84.7 FL — SIGNIFICANT CHANGE UP (ref 80–100)
MCV RBC AUTO: 89.7 FL — SIGNIFICANT CHANGE UP (ref 80–100)
NITRITE UR-MCNC: POSITIVE
PH UR: 6.5 — SIGNIFICANT CHANGE UP (ref 5–8)
PLATELET # BLD AUTO: 100 K/UL — LOW (ref 150–400)
PLATELET # BLD AUTO: 103 K/UL — LOW (ref 150–400)
POTASSIUM SERPL-MCNC: 4.1 MMOL/L — SIGNIFICANT CHANGE UP (ref 3.5–5.3)
POTASSIUM SERPL-SCNC: 4.1 MMOL/L — SIGNIFICANT CHANGE UP (ref 3.5–5.3)
PROT UR-MCNC: >=300 MG/DL
RBC # BLD: 2.34 M/UL — LOW (ref 4.2–5.8)
RBC # BLD: 2.48 M/UL — LOW (ref 4.2–5.8)
RBC # FLD: 14.2 % — SIGNIFICANT CHANGE UP (ref 10.3–16.9)
RBC # FLD: 18.6 % — HIGH (ref 10.3–16.9)
SODIUM SERPL-SCNC: 136 MMOL/L — SIGNIFICANT CHANGE UP (ref 135–145)
SP GR SPEC: >=1.03 — SIGNIFICANT CHANGE UP (ref 1–1.03)
UROBILINOGEN FLD QL: 1 E.U./DL — SIGNIFICANT CHANGE UP
WBC # BLD: 9.1 K/UL — SIGNIFICANT CHANGE UP (ref 3.8–10.5)
WBC # BLD: 9.5 K/UL — SIGNIFICANT CHANGE UP (ref 3.8–10.5)
WBC # FLD AUTO: 9.1 K/UL — SIGNIFICANT CHANGE UP (ref 3.8–10.5)
WBC # FLD AUTO: 9.5 K/UL — SIGNIFICANT CHANGE UP (ref 3.8–10.5)

## 2017-09-26 PROCEDURE — 99232 SBSQ HOSP IP/OBS MODERATE 35: CPT

## 2017-09-26 RX ORDER — ATENOLOL 25 MG/1
1 TABLET ORAL
Qty: 0 | Refills: 0 | DISCHARGE
Start: 2017-09-26

## 2017-09-26 RX ORDER — FLUOXETINE HCL 10 MG
1 CAPSULE ORAL
Qty: 0 | Refills: 0 | DISCHARGE
Start: 2017-09-26

## 2017-09-26 RX ORDER — POLYETHYLENE GLYCOL 3350 17 G/17G
17 POWDER, FOR SOLUTION ORAL
Qty: 0 | Refills: 0 | COMMUNITY
Start: 2017-09-26

## 2017-09-26 RX ORDER — TRAMADOL HYDROCHLORIDE 50 MG/1
50 TABLET ORAL EVERY 4 HOURS
Qty: 0 | Refills: 0 | Status: DISCONTINUED | OUTPATIENT
Start: 2017-09-26 | End: 2017-09-27

## 2017-09-26 RX ORDER — LACTULOSE 10 G/15ML
20 SOLUTION ORAL ONCE
Qty: 0 | Refills: 0 | Status: COMPLETED | OUTPATIENT
Start: 2017-09-26 | End: 2017-09-26

## 2017-09-26 RX ORDER — TRAMADOL HYDROCHLORIDE 50 MG/1
1 TABLET ORAL
Qty: 0 | Refills: 0 | DISCHARGE
Start: 2017-09-26

## 2017-09-26 RX ORDER — ASPIRIN/CALCIUM CARB/MAGNESIUM 324 MG
1 TABLET ORAL
Qty: 0 | Refills: 0 | DISCHARGE
Start: 2017-09-26

## 2017-09-26 RX ORDER — ACETAMINOPHEN 500 MG
1000 TABLET ORAL ONCE
Qty: 0 | Refills: 0 | Status: COMPLETED | OUTPATIENT
Start: 2017-09-26 | End: 2017-09-26

## 2017-09-26 RX ORDER — KETOROLAC TROMETHAMINE 30 MG/ML
15 SYRINGE (ML) INJECTION EVERY 6 HOURS
Qty: 0 | Refills: 0 | Status: DISCONTINUED | OUTPATIENT
Start: 2017-09-26 | End: 2017-09-27

## 2017-09-26 RX ORDER — ACETAMINOPHEN 500 MG
2 TABLET ORAL
Qty: 0 | Refills: 0 | COMMUNITY
Start: 2017-09-26

## 2017-09-26 RX ORDER — DOCUSATE SODIUM 100 MG
1 CAPSULE ORAL
Qty: 0 | Refills: 0 | COMMUNITY
Start: 2017-09-26

## 2017-09-26 RX ORDER — SENNA PLUS 8.6 MG/1
2 TABLET ORAL
Qty: 0 | Refills: 0 | COMMUNITY
Start: 2017-09-26

## 2017-09-26 RX ADMIN — OXYCODONE HYDROCHLORIDE 5 MILLIGRAM(S): 5 TABLET ORAL at 06:11

## 2017-09-26 RX ADMIN — Medication 325 MILLIGRAM(S): at 06:11

## 2017-09-26 RX ADMIN — Medication 15 MILLIGRAM(S): at 13:44

## 2017-09-26 RX ADMIN — Medication 325 MILLIGRAM(S): at 18:20

## 2017-09-26 RX ADMIN — Medication 20 MILLIGRAM(S): at 11:09

## 2017-09-26 RX ADMIN — Medication 1 TABLET(S): at 11:10

## 2017-09-26 RX ADMIN — ATENOLOL 50 MILLIGRAM(S): 25 TABLET ORAL at 11:08

## 2017-09-26 RX ADMIN — POLYETHYLENE GLYCOL 3350 17 GRAM(S): 17 POWDER, FOR SOLUTION ORAL at 11:10

## 2017-09-26 RX ADMIN — OXYCODONE HYDROCHLORIDE 5 MILLIGRAM(S): 5 TABLET ORAL at 07:11

## 2017-09-26 RX ADMIN — Medication 100 MILLIGRAM(S): at 06:11

## 2017-09-26 RX ADMIN — Medication 400 MILLIGRAM(S): at 11:11

## 2017-09-26 RX ADMIN — Medication 650 MILLIGRAM(S): at 18:20

## 2017-09-26 RX ADMIN — Medication 325 MILLIGRAM(S): at 06:13

## 2017-09-26 RX ADMIN — Medication 325 MILLIGRAM(S): at 11:09

## 2017-09-26 RX ADMIN — Medication 100 MILLIGRAM(S): at 13:44

## 2017-09-26 RX ADMIN — Medication 650 MILLIGRAM(S): at 06:12

## 2017-09-26 RX ADMIN — LACTULOSE 20 GRAM(S): 10 SOLUTION ORAL at 11:10

## 2017-09-26 RX ADMIN — Medication 15 MILLIGRAM(S): at 14:23

## 2017-09-26 RX ADMIN — Medication 650 MILLIGRAM(S): at 11:09

## 2017-09-26 NOTE — PROGRESS NOTE ADULT - SUBJECTIVE AND OBJECTIVE BOX
ORTHO NOTE    [x ] Pt seen/examined.  [x] Pt without any complaints/in NAD.    [ ] Pt complains of:      ROS: [ ] Fever  [ ] Chills  [ ] CP [ ] SOB [ ] Dysnea  [ ] Palpitations [ ] Cough [ ] N/V/C/D [ ] Paresthia [ ] Other     [x ] ROS  otherwise negative    .Denies SOB, cough    PHYSICAL EXAM:    Vital Signs Last 24 Hrs  T(C): 36.1 (26 Sep 2017 12:31), Max: 37.1 (25 Sep 2017 15:18)  T(F): 97 (26 Sep 2017 12:31), Max: 98.8 (25 Sep 2017 15:18)  HR: 70 (26 Sep 2017 09:03) (70 - 84)  BP: 94/55 (26 Sep 2017 12:31) (94/55 - 120/74)  BP(mean): --  RR: 16 (26 Sep 2017 12:31) (15 - 17)  SpO2: 98% (26 Sep 2017 12:31) (96% - 98%)    I&O's Detail    25 Sep 2017 07:01  -  26 Sep 2017 07:00  --------------------------------------------------------  IN:    lactated ringers.: 700 mL    Oral Fluid: 720 mL  Total IN: 1420 mL    OUT:    Indwelling Catheter - Urethral: 1025 mL  Total OUT: 1025 mL    Total NET: 395 mL           CAPILLARY BLOOD GLUCOSE                      Neuro: AAOX1, pleasant (reoriented to time and place)    Lungs: CTA, IS demonstrated    CV:    ABD: soft, nontender    Ext: right hip aquacel cdi, R LE NVID strength 5/5, heel slides demonstrated    LABS                        6.9    9.5   )-----------( 103      ( 26 Sep 2017 06:58 )             21.0                                09-26    136  |  101  |  17  ----------------------------<  119<H>  4.1   |  25  |  0.98    Ca    8.2<L>      26 Sep 2017 06:58        [ ] Other Labs  [ ] None ordered            Please check or [place] when present:  •  Heart Failure:    [ ] Acute        [ ]  Acute on Chronic        [ ] Chronic         [ ] Diastolic     [ ]  Combined    •  CONNER:     [ ] ATN        [ ]  Renal medullary necrosis       [ ]  Renal cortical necrosis                  [ ] Other pathological Lesion:  •  CKD:  [ ] Stage I   [ ] Stage II  [ ] Stage III    [ ]Stage IV   [ ]  CKD V   [ ]  Other/Unspecified:    •  Abdominal Nutritional Status:   [ ] Malnutrition-See Nutrition note    [ ] Cachexia   [ ]  Other        [ ] Supplement ordered:            [ ] Morbid Obesity: BMI >=40         ASSESSMENT/PLAN:      STATUS POST: R hip IM Nail pod2  H/H 6.9- 1UPRBC for acute blood loss anemia secondary to surgery.  Dr. Villanueva approved 2UPRBC.  F/u 8pm cbc to determine if second unit is necessary  Healthcare proxy consented with two providers for blood.  CONTINUE:          [ ] PT- wbat    [ ] DVT PPX- scd, ASA    [ ] Pain Mgt- po meds, IV tylenol x1    [ ] Dispo plan- [facility]

## 2017-09-26 NOTE — DISCHARGE NOTE ADULT - CARE PLAN
Principal Discharge DX:	Hip fracture  Goal:	Improved ambulation and decreased pain after right hip intramedullary nail 9/24/17  Instructions for follow-up, activity and diet:	Weight bearing as tolerated on right leg with rolling walker and assistance.  No strenuous activity, heavy lifting, driving, tub bathing, or returning to work until cleared by MD.  You may shower--dressing is waterproof.  Remove dressing after post op day 7, then leave incision open to air.  Follow up with Dr. Barnard to schedule an appt within 10-14 days.  If you don't have a bowel movement by post op day 3, then take Milk of Magnesia (over the counter).  If no bowel movement by at least post op day 5, then use a Dulcolax suppository (over the counter) and/or a Fleets enema--if still no bowel movement, call your MD.  Contact your doctor if you experience: fever greater than 101.5, chills, chest pain, difficulty breathing, bleeding, redness or heat around the incision. Principal Discharge DX:	Hip fracture  Goal:	Improved ambulation and decreased pain after right hip intramedullary nail 9/24/17  Instructions for follow-up, activity and diet:	Weight bearing as tolerated on right leg with rolling walker and assistance.  No strenuous activity, heavy lifting, driving, tub bathing, or returning to work until cleared by MD.  You may shower--dressing is waterproof.  Remove dressing after post op day 7, then leave incision open to air.  Follow up with Dr. Barnard to schedule an appt within 10-14 days.  If you don't have a bowel movement by post op day 3, then take Milk of Magnesia (over the counter).  If no bowel movement by at least post op day 5, then use a Dulcolax suppository (over the counter) and/or a Fleets enema--if still no bowel movement, call your MD.  Contact your doctor if you experience: fever greater than 101.5, chills, chest pain, difficulty breathing, bleeding, redness or heat around the incision.  Patient has a glynn catheter, as he failed his void trial.  After patient has his next BM you may repeat TOV.

## 2017-09-26 NOTE — DISCHARGE NOTE ADULT - PATIENT PORTAL LINK FT
“You can access the [employer] Patient Portal, offered by [facility], by registering with the following website: [email or web address]”

## 2017-09-26 NOTE — PROGRESS NOTE ADULT - SUBJECTIVE AND OBJECTIVE BOX
Chief Complaint/Reason for Consult: periop cv mgmt  INTERVAL HPI: post op s complcations, s/p 1 no signs of cardiac decompensation   	  MEDICATIONS:  ATENolol  Tablet 50 milliGRAM(s) Oral daily        oxyCODONE    IR 10 milliGRAM(s) Oral every 4 hours PRN  oxyCODONE    IR 5 milliGRAM(s) Oral every 4 hours PRN  morphine  - Injectable 4 milliGRAM(s) IV Push every 4 hours PRN  FLUoxetine 20 milliGRAM(s) Oral daily  acetaminophen   Tablet 650 milliGRAM(s) Oral every 6 hours PRN  acetaminophen   Tablet 650 milliGRAM(s) Oral every 6 hours  ondansetron Injectable 4 milliGRAM(s) IV Push every 6 hours PRN    aluminum hydroxide/magnesium hydroxide/simethicone Suspension 30 milliLiter(s) Oral four times a day PRN  polyethylene glycol 3350 17 Gram(s) Oral daily  bisacodyl Suppository 10 milliGRAM(s) Rectal daily PRN  senna 2 Tablet(s) Oral at bedtime PRN  magnesium hydroxide Suspension 30 milliLiter(s) Oral daily PRN  docusate sodium 100 milliGRAM(s) Oral three times a day      influenza  Vaccine (HIGH DOSE) 0.5 milliLiter(s) IntraMuscular once  lactated ringers. 1000 milliLiter(s) IV Continuous <Continuous>  aspirin enteric coated 325 milliGRAM(s) Oral two times a day  ferrous    sulfate 325 milliGRAM(s) Oral three times a day with meals  multivitamin 1 Tablet(s) Oral daily      REVIEW OF SYSTEMS:  [x] As per HPI  CONSTITUTIONAL: No fever, weight loss, or fatigue  RESPIRATORY: No cough, wheezing, chills or hemoptysis; No Shortness of Breath  CARDIOVASCULAR: No chest pain, palpitations, dizziness, or leg swelling  GASTROINTESTINAL: No abdominal or epigastric pain. No nausea, vomiting, or hematemesis; No diarrhea or constipation. No melena or hematochezia.  MUSCULOSKELETAL: No joint pain or swelling; No muscle, back, or extremity pain  [x] All others negative	  [ ] Unable to obtain    PHYSICAL EXAM:  T(C): 36.4 (09-26-17 @ 05:11), Max: 37.1 (09-25-17 @ 15:18)  HR: 70 (09-26-17 @ 05:11) (70 - 84)  BP: 102/55 (09-26-17 @ 05:11) (98/58 - 120/74)  RR: 17 (09-26-17 @ 05:11) (15 - 17)  SpO2: 97% (09-26-17 @ 05:11) (96% - 98%)  Wt(kg): --  I&O's Summary    25 Sep 2017 07:01  -  26 Sep 2017 07:00  --------------------------------------------------------  IN: 1420 mL / OUT: 1025 mL / NET: 395 mL          Appearance: Normal	  HEENT:   Normal oral mucosa  Cardiovascular: Normal S1 S2, No JVD, No murmurs, No edema  Respiratory: Lungs clear to auscultation	  Gastrointestinal:  Soft, Non-tender, + BS	  Extremities: Normal range of motion, No clubbing, cyanosis or edema  Vascular: Peripheral pulses palpable 2+ bilaterally    TELEMETRY: 	    ECG:   	  RADIOLOGY:   CXR:  CT:  US:    CARDIAC TESTING:  Echocardiogram:  Catheterization:  Stress Test:      LABS:	 	    CARDIAC MARKERS:                                  7.2    10.1  )-----------( 139      ( 25 Sep 2017 08:48 )             21.9     09-25    139  |  103  |  20  ----------------------------<  148<H>  5.0   |  25  |  0.95    Ca    8.7      25 Sep 2017 08:48    TPro  7.7  /  Alb  3.9  /  TBili  0.4  /  DBili  x   /  AST  20  /  ALT  12  /  AlkPhos  102  09-24    proBNP:   Lipid Profile:   HgA1c:   TSH:     ASSESSMENT/PLAN: 	    # post op s complcations   s/p1 U PRBC recheck CBC today to keep HgB >8  lung check stable no signs of decompensation      #CV Prevention -   q3mo Fasting Lipid Profile, Goal LDL<100, statin as tolerated.  q6week TSH check  q3mo 25-OHD Vitamin D Level, Goal 50, supplement as tolerated

## 2017-09-26 NOTE — DISCHARGE NOTE ADULT - CARE PROVIDER_API CALL
Joe Barnard), Orthopaedic Surgery  [address]  [address] [place] [address]  Phone: [phone]  Fax: [phone]

## 2017-09-26 NOTE — PROVIDER CONTACT NOTE (MEDICATION) - SITUATION
one unit blood order health care proxy  contact  send for the blood to floor second rn question signiture  if two p/a or md have to hear conversation , several hr pass to have second p/a to sign taya

## 2017-09-26 NOTE — DISCHARGE NOTE ADULT - HOSPITAL COURSE
Admit9/24/17  OR 9/24/17- R hip IM Nail  Periop Antibx  DVT ppx  PT   Pain mgt Admit9/24/17  OR 9/24/17- R hip IM Nail  Periop Antibx  DVT ppx  PT   Pain mgt  1UPRBC for acute blood loss anemia secondary to surgery 9/25/17  1UPRBC for acute blood loss anemia secondary to surgery 9/26/17 Admit9/24/17  OR 9/24/17- R hip IM Nail  Periop Antibx  DVT ppx  PT   Pain mgt  1UPRBC for acute blood loss anemia secondary to surgery 9/25/17  3UPRBC for acute blood loss anemia secondary to surgery 9/26/17 into 9/27/17  cardiology c/s

## 2017-09-26 NOTE — DISCHARGE NOTE ADULT - MEDICATION SUMMARY - MEDICATIONS TO TAKE
I will START or STAY ON the medications listed below when I get home from the hospital:    aspirin 325 mg oral delayed release tablet  -- 1 tab(s) by mouth 2 times a day  Take for 4 weeks after surgery to prevent blood clots  -- Indication: For Dvt ppx    acetaminophen 325 mg oral tablet  -- 2 tab(s) by mouth every 6 hours  Do not administer more than 4,000mg in a day  -- Indication: For Pain control after right hip IM Nail    traMADol 50 mg oral tablet  -- 1 tab(s) by mouth every 4 hours, As needed, Moderate Pain (4 - 6)  -- Indication: For moderate pain    FLUoxetine 20 mg oral capsule  -- 1 cap(s) by mouth once a day  -- Indication: For Home antidepressant    atenolol 50 mg oral tablet  -- 1 tab(s) by mouth once a day  -- Indication: For beta blocker    bisacodyl 10 mg rectal suppository  -- 1 suppository(ies) rectally once a day, As needed, If no bowel movement by POD#2  -- Indication: For constipation    docusate sodium 100 mg oral capsule  -- 1 cap(s) by mouth 3 times a day  -- Indication: For constipation    polyethylene glycol 3350 oral powder for reconstitution  -- 17 gram(s) by mouth once a day  -- Indication: For constipation    senna oral tablet  -- 2 tab(s) by mouth once a day (at bedtime), As needed, Constipation  -- Indication: For constipation    Multiple Vitamins oral tablet  -- 1 tab(s) by mouth once a day  -- Indication: For supplement

## 2017-09-26 NOTE — DISCHARGE NOTE ADULT - PLAN OF CARE
Improved ambulation and decreased pain after right hip intramedullary nail 9/24/17 Weight bearing as tolerated on right leg with rolling walker and assistance.  No strenuous activity, heavy lifting, driving, tub bathing, or returning to work until cleared by MD.  You may shower--dressing is waterproof.  Remove dressing after post op day 7, then leave incision open to air.  Follow up with Dr. Barnard to schedule an appt within 10-14 days.  If you don't have a bowel movement by post op day 3, then take Milk of Magnesia (over the counter).  If no bowel movement by at least post op day 5, then use a Dulcolax suppository (over the counter) and/or a Fleets enema--if still no bowel movement, call your MD.  Contact your doctor if you experience: fever greater than 101.5, chills, chest pain, difficulty breathing, bleeding, redness or heat around the incision. Weight bearing as tolerated on right leg with rolling walker and assistance.  No strenuous activity, heavy lifting, driving, tub bathing, or returning to work until cleared by MD.  You may shower--dressing is waterproof.  Remove dressing after post op day 7, then leave incision open to air.  Follow up with Dr. Barnard to schedule an appt within 10-14 days.  If you don't have a bowel movement by post op day 3, then take Milk of Magnesia (over the counter).  If no bowel movement by at least post op day 5, then use a Dulcolax suppository (over the counter) and/or a Fleets enema--if still no bowel movement, call your MD.  Contact your doctor if you experience: fever greater than 101.5, chills, chest pain, difficulty breathing, bleeding, redness or heat around the incision.  Patient has a glynn catheter, as he failed his void trial.  After patient has his next BM you may repeat TOV.

## 2017-09-27 VITALS
DIASTOLIC BLOOD PRESSURE: 56 MMHG | SYSTOLIC BLOOD PRESSURE: 108 MMHG | TEMPERATURE: 98 F | RESPIRATION RATE: 16 BRPM | OXYGEN SATURATION: 97 % | HEART RATE: 62 BPM

## 2017-09-27 LAB
ANION GAP SERPL CALC-SCNC: 11 MMOL/L — SIGNIFICANT CHANGE UP (ref 5–17)
BUN SERPL-MCNC: 17 MG/DL — SIGNIFICANT CHANGE UP (ref 7–23)
CALCIUM SERPL-MCNC: 8.5 MG/DL — SIGNIFICANT CHANGE UP (ref 8.4–10.5)
CHLORIDE SERPL-SCNC: 102 MMOL/L — SIGNIFICANT CHANGE UP (ref 96–108)
CO2 SERPL-SCNC: 25 MMOL/L — SIGNIFICANT CHANGE UP (ref 22–31)
CREAT SERPL-MCNC: 0.91 MG/DL — SIGNIFICANT CHANGE UP (ref 0.5–1.3)
GLUCOSE SERPL-MCNC: 117 MG/DL — HIGH (ref 70–99)
HCT VFR BLD CALC: 26.3 % — LOW (ref 39–50)
HGB BLD-MCNC: 8.9 G/DL — LOW (ref 13–17)
MCHC RBC-ENTMCNC: 28.8 PG — SIGNIFICANT CHANGE UP (ref 27–34)
MCHC RBC-ENTMCNC: 33.8 G/DL — SIGNIFICANT CHANGE UP (ref 32–36)
MCV RBC AUTO: 85.1 FL — SIGNIFICANT CHANGE UP (ref 80–100)
PLATELET # BLD AUTO: 106 K/UL — LOW (ref 150–400)
POTASSIUM SERPL-MCNC: 4.3 MMOL/L — SIGNIFICANT CHANGE UP (ref 3.5–5.3)
POTASSIUM SERPL-SCNC: 4.3 MMOL/L — SIGNIFICANT CHANGE UP (ref 3.5–5.3)
RBC # BLD: 3.09 M/UL — LOW (ref 4.2–5.8)
RBC # FLD: 17 % — HIGH (ref 10.3–16.9)
SODIUM SERPL-SCNC: 138 MMOL/L — SIGNIFICANT CHANGE UP (ref 135–145)
WBC # BLD: 8.6 K/UL — SIGNIFICANT CHANGE UP (ref 3.8–10.5)
WBC # FLD AUTO: 8.6 K/UL — SIGNIFICANT CHANGE UP (ref 3.8–10.5)

## 2017-09-27 PROCEDURE — 99232 SBSQ HOSP IP/OBS MODERATE 35: CPT

## 2017-09-27 RX ADMIN — Medication 650 MILLIGRAM(S): at 05:57

## 2017-09-27 RX ADMIN — Medication 325 MILLIGRAM(S): at 11:31

## 2017-09-27 RX ADMIN — ATENOLOL 50 MILLIGRAM(S): 25 TABLET ORAL at 06:03

## 2017-09-27 RX ADMIN — Medication 100 MILLIGRAM(S): at 05:57

## 2017-09-27 RX ADMIN — Medication 325 MILLIGRAM(S): at 05:57

## 2017-09-27 RX ADMIN — Medication 100 MILLIGRAM(S): at 13:33

## 2017-09-27 RX ADMIN — TRAMADOL HYDROCHLORIDE 50 MILLIGRAM(S): 50 TABLET ORAL at 09:14

## 2017-09-27 RX ADMIN — TRAMADOL HYDROCHLORIDE 50 MILLIGRAM(S): 50 TABLET ORAL at 10:10

## 2017-09-27 RX ADMIN — Medication 20 MILLIGRAM(S): at 11:27

## 2017-09-27 RX ADMIN — POLYETHYLENE GLYCOL 3350 17 GRAM(S): 17 POWDER, FOR SOLUTION ORAL at 11:27

## 2017-09-27 RX ADMIN — MAGNESIUM HYDROXIDE 30 MILLILITER(S): 400 TABLET, CHEWABLE ORAL at 05:57

## 2017-09-27 RX ADMIN — Medication 1 TABLET(S): at 11:31

## 2017-09-27 RX ADMIN — Medication 325 MILLIGRAM(S): at 06:42

## 2017-09-27 RX ADMIN — Medication 650 MILLIGRAM(S): at 11:27

## 2017-09-27 NOTE — PROGRESS NOTE ADULT - SUBJECTIVE AND OBJECTIVE BOX
Chief Complaint/Reason for Consult: periop cv mgmt  INTERVAL HPI: post op s complcations, s/p 2U PRBC no signs of cardiac decompensation   	  MEDICATIONS:  ATENolol  Tablet 50 milliGRAM(s) Oral daily        FLUoxetine 20 milliGRAM(s) Oral daily  acetaminophen   Tablet 650 milliGRAM(s) Oral every 6 hours PRN  acetaminophen   Tablet 650 milliGRAM(s) Oral every 6 hours  ondansetron Injectable 4 milliGRAM(s) IV Push every 6 hours PRN  traMADol 50 milliGRAM(s) Oral every 4 hours PRN    aluminum hydroxide/magnesium hydroxide/simethicone Suspension 30 milliLiter(s) Oral four times a day PRN  polyethylene glycol 3350 17 Gram(s) Oral daily  bisacodyl Suppository 10 milliGRAM(s) Rectal daily PRN  senna 2 Tablet(s) Oral at bedtime PRN  magnesium hydroxide Suspension 30 milliLiter(s) Oral daily PRN  docusate sodium 100 milliGRAM(s) Oral three times a day      influenza  Vaccine (HIGH DOSE) 0.5 milliLiter(s) IntraMuscular once  lactated ringers. 1000 milliLiter(s) IV Continuous <Continuous>  aspirin enteric coated 325 milliGRAM(s) Oral two times a day  ferrous    sulfate 325 milliGRAM(s) Oral three times a day with meals  multivitamin 1 Tablet(s) Oral daily      REVIEW OF SYSTEMS:  [x] As per HPI  CONSTITUTIONAL: No fever, weight loss, or fatigue  RESPIRATORY: No cough, wheezing, chills or hemoptysis; No Shortness of Breath  CARDIOVASCULAR: No chest pain, palpitations, dizziness, or leg swelling  GASTROINTESTINAL: No abdominal or epigastric pain. No nausea, vomiting, or hematemesis; No diarrhea or constipation. No melena or hematochezia.  MUSCULOSKELETAL: No joint pain or swelling; No muscle, back, or extremity pain  [x] All others negative	  [ ] Unable to obtain    PHYSICAL EXAM:  T(C): 36.4 (09-27-17 @ 09:01), Max: 37.6 (09-26-17 @ 15:44)  HR: 62 (09-27-17 @ 09:01) (62 - 87)  BP: 108/56 (09-27-17 @ 09:01) (98/50 - 119/56)  RR: 16 (09-27-17 @ 09:01) (14 - 16)  SpO2: 97% (09-27-17 @ 09:01) (97% - 100%)  Wt(kg): --  I&O's Summary    26 Sep 2017 07:01  -  27 Sep 2017 07:00  --------------------------------------------------------  IN: 2690 mL / OUT: 1875 mL / NET: 815 mL    27 Sep 2017 07:01  -  27 Sep 2017 12:40  --------------------------------------------------------  IN: 0 mL / OUT: 400 mL / NET: -400 mL          Appearance: Normal	  HEENT:   Normal oral mucosa  Cardiovascular: Normal S1 S2, No JVD, No murmurs, No edema  Respiratory: Lungs clear to auscultation	  Gastrointestinal:  Soft, Non-tender, + BS	  Extremities: Normal range of motion, No clubbing, cyanosis or edema  Vascular: Peripheral pulses palpable 2+ bilaterally    TELEMETRY: 	    ECG:   	  RADIOLOGY:   CXR:  CT:  US:    CARDIAC TESTING:  Echocardiogram:  Catheterization:  Stress Test:      LABS:	 	    CARDIAC MARKERS:                                  8.9    8.6   )-----------( 106      ( 27 Sep 2017 07:06 )             26.3     09-27    138  |  102  |  17  ----------------------------<  117<H>  4.3   |  25  |  0.91    Ca    8.5      27 Sep 2017 07:06      proBNP:   Lipid Profile:   HgA1c:   TSH:     ASSESSMENT/PLAN: 	    # post op s complcations   s/p2 U PRBC HgB >8 no signs of decompensation  Cardiac stable for discharge       #CV Prevention -   q3mo Fasting Lipid Profile, Goal LDL<100, statin as tolerated.  q6week TSH check  q3mo 25-OHD Vitamin D Level, Goal 50, supplement as tolerated

## 2017-09-27 NOTE — PROGRESS NOTE ADULT - SUBJECTIVE AND OBJECTIVE BOX
Patient seen and examined at bedside.      S: still pleasantly confused.  pain tolerable.  ROS unremarkable.    O: T(C): 36.4 (09-27-17 @ 09:01), Max: 37.6 (09-26-17 @ 15:44)  HR: 62 (09-27-17 @ 09:01) (62 - 87)  BP: 108/56 (09-27-17 @ 09:01) (94/55 - 119/56)  RR: 16 (09-27-17 @ 09:01) (14 - 16)  SpO2: 97% (09-27-17 @ 09:01) (97% - 100%)  Wt(kg): --    NAD, AOx3, non-labored respirations  Abd soft, NTND  Dressing stain unchanged  Extremity soft, appropriate swelling and minimally TTP  foot warm and well perfused  SILT dP, sP, Sa, Aquino, T  firing TA/EHL/GS                          8.9    8.6   )-----------( 106      ( 27 Sep 2017 07:06 )             26.3       I&O's Detail    26 Sep 2017 07:01  -  27 Sep 2017 07:00  --------------------------------------------------------  IN:    lactated ringers.: 590 mL    Oral Fluid: 850 mL    Packed Red Blood Cells: 1250 mL  Total IN: 2690 mL    OUT:    Indwelling Catheter - Urethral: 1875 mL  Total OUT: 1875 mL    Total NET: 815 mL      27 Sep 2017 07:01  -  27 Sep 2017 11:03  --------------------------------------------------------  IN:  Total IN: 0 mL    OUT:    Indwelling Catheter - Urethral: 400 mL  Total OUT: 400 mL    Total NET: -400 mL      A/P: 87y Male s/p R femur ORIF, stable, responded to transfusion appropriately    - analgesia with bowel regimen  - WBAT with PT  - OOB ad julieta  - DVT ppx: aspirin enteric coated 325 milliGRAM(s) Oral two times a day  - ADAT  - f/u labs  - Dispo planning

## 2017-10-01 DIAGNOSIS — I44.0 ATRIOVENTRICULAR BLOCK, FIRST DEGREE: ICD-10-CM

## 2017-10-01 DIAGNOSIS — F03.90 UNSPECIFIED DEMENTIA, UNSPECIFIED SEVERITY, WITHOUT BEHAVIORAL DISTURBANCE, PSYCHOTIC DISTURBANCE, MOOD DISTURBANCE, AND ANXIETY: ICD-10-CM

## 2017-10-01 DIAGNOSIS — S72.141A DISPLACED INTERTROCHANTERIC FRACTURE OF RIGHT FEMUR, INITIAL ENCOUNTER FOR CLOSED FRACTURE: ICD-10-CM

## 2017-10-01 DIAGNOSIS — S72.001A FRACTURE OF UNSPECIFIED PART OF NECK OF RIGHT FEMUR, INITIAL ENCOUNTER FOR CLOSED FRACTURE: ICD-10-CM
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
## 2017-10-01 DIAGNOSIS — E43 UNSPECIFIED SEVERE PROTEIN-CALORIE MALNUTRITION: ICD-10-CM
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
## 2017-10-01 DIAGNOSIS — D62 ACUTE POSTHEMORRHAGIC ANEMIA: ICD-10-CM
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
## 2017-10-19 PROCEDURE — 86923 COMPATIBILITY TEST ELECTRIC: CPT

## 2017-10-19 PROCEDURE — 80053 COMPREHEN METABOLIC PANEL: CPT

## 2017-10-19 PROCEDURE — 84132 ASSAY OF SERUM POTASSIUM: CPT

## 2017-10-19 PROCEDURE — P9016: CPT

## 2017-10-19 PROCEDURE — 85610 PROTHROMBIN TIME: CPT

## 2017-10-19 PROCEDURE — 97110 THERAPEUTIC EXERCISES: CPT

## 2017-10-19 PROCEDURE — 81001 URINALYSIS AUTO W/SCOPE: CPT

## 2017-10-19 PROCEDURE — 99285 EMERGENCY DEPT VISIT HI MDM: CPT | Mod: 25

## 2017-10-19 PROCEDURE — 85025 COMPLETE CBC W/AUTO DIFF WBC: CPT

## 2017-10-19 PROCEDURE — 82330 ASSAY OF CALCIUM: CPT

## 2017-10-19 PROCEDURE — 80048 BASIC METABOLIC PNL TOTAL CA: CPT

## 2017-10-19 PROCEDURE — C1769: CPT

## 2017-10-19 PROCEDURE — 72170 X-RAY EXAM OF PELVIS: CPT

## 2017-10-19 PROCEDURE — 85018 HEMOGLOBIN: CPT

## 2017-10-19 PROCEDURE — 85730 THROMBOPLASTIN TIME PARTIAL: CPT

## 2017-10-19 PROCEDURE — 86850 RBC ANTIBODY SCREEN: CPT

## 2017-10-19 PROCEDURE — 84295 ASSAY OF SERUM SODIUM: CPT

## 2017-10-19 PROCEDURE — 36430 TRANSFUSION BLD/BLD COMPNT: CPT

## 2017-10-19 PROCEDURE — 86900 BLOOD TYPING SEROLOGIC ABO: CPT

## 2017-10-19 PROCEDURE — 81003 URINALYSIS AUTO W/O SCOPE: CPT

## 2017-10-19 PROCEDURE — 92610 EVALUATE SWALLOWING FUNCTION: CPT | Mod: GN

## 2017-10-19 PROCEDURE — 71045 X-RAY EXAM CHEST 1 VIEW: CPT

## 2017-10-19 PROCEDURE — 93005 ELECTROCARDIOGRAM TRACING: CPT

## 2017-10-19 PROCEDURE — 36415 COLL VENOUS BLD VENIPUNCTURE: CPT

## 2017-10-19 PROCEDURE — 96374 THER/PROPH/DIAG INJ IV PUSH: CPT

## 2017-10-19 PROCEDURE — C1713: CPT

## 2017-10-19 PROCEDURE — 76000 FLUOROSCOPY <1 HR PHYS/QHP: CPT

## 2017-10-19 PROCEDURE — 86901 BLOOD TYPING SEROLOGIC RH(D): CPT

## 2017-10-19 PROCEDURE — 73552 X-RAY EXAM OF FEMUR 2/>: CPT

## 2017-10-19 PROCEDURE — 73502 X-RAY EXAM HIP UNI 2-3 VIEWS: CPT

## 2017-10-19 PROCEDURE — 87641 MR-STAPH DNA AMP PROBE: CPT

## 2017-10-19 PROCEDURE — 85027 COMPLETE CBC AUTOMATED: CPT

## 2019-05-01 NOTE — CONSULT NOTE ADULT - SUBJECTIVE AND OBJECTIVE BOX
"[facility] Internal Medicine  Resident HO-I Progress Note    Admitting Team: [facility] Jerry REDDY  Attending Physician: Marino  Resident: Amy  Intern(s): Oma    Subjective:      Patient feeling well and at his baseline this morning.  Denies fever, CP/SOB, abdominal pain, N/V.  Tolerating PO.     Objective:   Last 24 Hour Vital Signs:  BP  Min: 108/68  Max: 153/86  Temp  Av.5 °F (36.4 °C)  Min: 96.4 °F (35.8 °C)  Max: 97.9 °F (36.6 °C)  Pulse  Av.4  Min: 78  Max: 110  Resp  Av.3  Min: 18  Max: 20  SpO2  Av.6 %  Min: 95 %  Max: 98 %  Height  Av' 8" (172.7 cm)  Min: 5' 8" (172.7 cm)  Max: 5' 8" (172.7 cm)  Weight  Av.6 kg (153 lb 5.3 oz)  Min: 69 kg (152 lb 1.9 oz)  Max: 70.1 kg (154 lb 8.7 oz)  I/O last 3 completed shifts:  In: 540 [P.O.:540]  Out: 100 [Urine:100]    Physical Examination:  Gen: Well-developed, well-nourished white man, non-toxic appearing, without respiratory distress, A&Ox4, in NAD, cooperative, lying comfortably in hospital bed  HEENT: NCAT. PERRL, EOMI, anicteric sclera, clear conjunctiva. External ears/nose normal. OP clear, MMM. Neck supple; trachea midline  CV: RRR; no murmurs/rubs, no extra heart sounds.  2+ radial & dp pulses bilaterally.  Pulm: CTAB. No wheezes/rales/rhonchi.  No increased WOB.  Chest: No tenderness or deformity  Abd: +BS. S/NTND. No rebound/guarding.  No organomegaly.    Back: No tenderness or deformity.    Ext: Atraumatic; warm and well-perfused.  No clubbing, cyanosis, or edema.  Skin: Normal texture and turgor; no rashes or lesions   Neurologic: A&Ox4.  No facial droop, dysarthria, or aphasia.  Moving all extremities symmetrically and without abnormal tone.  SILT.      Laboratory:  Laboratory:  Recent Labs   Lab 19  0946   WBC 4.15 3.24* 4.14   HGB 12.5* 12.4* 12.8*    234 251   MCV 86 86 87   RDW 15.0* 15.0* 15.0*       Recent Labs   Lab 19  0946   * " 134* 133*   K 4.3 4.3 4.4    102 101   CO2 21* 21* 26   BUN 11 10 11   CREATININE 1.0 0.9 0.9    84 117*   CALCIUM 9.2 9.0 9.2   PROT 6.9 6.5 6.9   ALBUMIN 3.6 3.3* 3.5   AST 20 20 22   ALT 11 10 13   ALKPHOS 64 61 67       Microbiology:  None    Other Results:  EKG (my interpretation): none new    Radiology:  TTE (4/30): in process    Abd XR (4/30): No evidence of bowel obstruction.    CXR (4/29): Nonspecific interstitial prominence which may reflect chronic change versus mild pulmonary interstitial edema.    CTA H/N (4/29):   1. Occlusion of the left vertebral artery origin of uncertain chronicity.  Reconstitution of flow is seen distally within the left vertebral artery distal V2 segment.  2. Approximately 50% stenosis involving the bilateral internal carotid artery origins.  3. Small caliber vertebrobasilar system with persistent bilateral fetal circulation seen with prominent bilateral PCOM's supplying flow to the PCA's.    CT Head non-con (4/29): Advanced involutional change without acute infarct or hemorrhage or mass or fracture.    Current Medications:     Infusions:       Scheduled:   aspirin  81 mg Oral Daily    atorvastatin  40 mg Oral Daily    clopidogrel  75 mg Oral Daily    heparin (porcine)  5,000 Units Subcutaneous Q8H    ramelteon  8 mg Oral QHS        PRN:  pneumoc 13-edwin conj-dip cr(PF), sodium chloride 0.9%    Antibiotics and Day Number of Therapy:  None    Lines and Day Number of Therapy:  RFA PIV (4/29-present)    Assessment:     89y/o white man with medical history of HTN, unspecified CHF, CAD, CABG x3, admitted for observation for lightheadedness 2/2 vertigo vs TIA    Plan:     #Lightheadedness, Suspected 2/2 Vertigo vs TIA   - Acute unsteadiness on evening of admission that began while sitting but worsened when standing. Denied positional/postural aggravation, but c/o several days of congestion and right ear fullness.  Multi-directional nystagmus per ED, but resolved before  arrival of hospitalist team  - No FNDs  - NIHSS 0  - ABCD2 score 3  - Head CT with advanced involutional change without acute findings  - CTA H/N showing 50% bilateral ICA stenosis and left vertebral artery occlusion; concerning for posterior circulation cerebral ischemia  - Tele-stroke consulted; ASA 325mg and Plavix 75mg given in ED.   - Telemetry and q4h neuro checks   - Continue DAPT and lipitor 40mg  - MRI brain declined by patient twice over past two days  - TTE in process    #Asymmetric Blood Pressures  - Patient with asymptomatic asymmetic Bps: LUE 82/62 and /62  - Asymptomatic, otherwise HDS, and without CP/SOB, diaphoresis, or syncope  - Low suspicion for aortic dissection at this time.  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  Mild troponemia but no evidence for acute ischemia on ECG; likely 2/2 demand.  No prior ECGs for comparison  - ASA 81 & high-intensity statin; will continue at discharge  - Will discharge on coreg.  May receive ACEi in Cardiology Clinic if tolerating BB  - OP referral for [facility] Cardiology clinic     #Unspecified HF  - CHF  noted on chart review, but no echocardiograms in EMR  - No home meds  - No CP/SOB; no evidence of volume overload  - TTE in process  - GDMT as above (will discharge on low-dose BB as patient has remained normotensive; if tolerating well, can have ACEi added at follow-up with [facility]iology     #Abnormal ECG  - RBBB and left anterior fascicular block on ED ECG; no prior ECGs to compare  - Known h/o CABG x3  - Asymptomatic  - Telemetry  - Will refer for close Cardiology follow-up with [facility] prior to discharge     #Normocytic anemia  - H/H 12.5/37.5 with MCV 86 on admit; consistent with H/H from previous labs 5 years ago  - Asymptomatic, HDS, and without signs/symptoms of bleeding  - Folate & vit B12 WNL  - Continue to monitor     #HCM  - Lipid panel WNL  - TSH WNL  - A1c WNL  - To receive Prevnar & Tdap prior to discharge  - Will need OP colonoscopy  - Placement with [facility] (residential [facility]); PPD placed     Code: Full  PPX: SQH  Diet: Cardiac     Dispo: Discharge to residential NH pending TTE and further evaluation of asymmetric upper extremity BPs      Wenceslao Ernandez MD  [facility] IM/EM PGY-1  5/1/2019 @ 2:39 PM  -------------------------------------------  [facility] Hospitalist Team B   Patient is a 87y old  Male who presents with a chief complaint of R hip pain (24 Sep 2017 11:22)        HPI:  87M PMH dementia and HTN BIBA from [facility] with R hip pain after being found down. Staff did not witness fall but reports he could have fallen out of bed onto R side, while pt does not remember falling. C/o R hip and knee pain. Unable to walk/bear weight.  Denies other trauma, loc, numbness/weakness. (24 Sep 2017 11:22)    Unable to get accurate cardiac hx from pt due to dementia.  Pt denies any CP, SOB, palpitations, dizziness.  Only endorsing right hip pain.   Spoke to HCP Daughter in law Evelyn Vital.  No known cardiac problems.  He has been living at [facility] for last 2 years.  Able to walk a little bit.  Non smoker. No Etoh use. No known hx CVA.  No known hx of prior surgeries per HCP.      PAST MEDICAL & SURGICAL HISTORY:  Dementia without behavioral disturbance, unspecified dementia type  Essential hypertension  No significant past surgical history    FAMILY HISTORY:  No pertinent family history in first degree relatives    Social:  Allergies    No Known Allergies    Intolerances    	  MEDICATIONS:        acetaminophen   Tablet 650 milliGRAM(s) Oral every 6 hours PRN  oxyCODONE    IR 10 milliGRAM(s) Oral every 4 hours PRN  oxyCODONE    IR 5 milliGRAM(s) Oral every 4 hours PRN  morphine  - Injectable 4 milliGRAM(s) IV Push every 4 hours PRN    docusate sodium 100 milliGRAM(s) Oral three times a day      lactated ringers. 1000 milliLiter(s) IV Continuous <Continuous>  influenza  Vaccine (HIGH DOSE) 0.5 milliLiter(s) IntraMuscular once      PHYSICAL EXAM:  T(C): 37.1 (09-24-17 @ 13:15), Max: 37.4 (09-24-17 @ 12:07)  HR: 72 (09-24-17 @ 13:15) (53 - 72)  BP: 125/76 (09-24-17 @ 13:15) (101/62 - 125/76)  RR: 16 (09-24-17 @ 13:15) (16 - 18)  SpO2: 100% (09-24-17 @ 13:15) (97% - 100%)  Wt(kg): --  I&O's Summary      Weight (kg): 63.5 (09-24 @ 07:16)    Gen: NAD, AAOx1, comfortable, dry mucus membranes  Neck: no JVD  Cardiovascular: barrel chest, difficult to auscultate, Normal S1 S2, No murmurs,    Respiratory: Lungs clear to auscultation	   Ext: no edema, palpable dp/tp b/l      TELEMETRY: 	    ECG: Sinus bradycardia, first deg AV block, RBBB, LAFB 	  RADIOLOGY:  CXR - clear lungs    LABS:	 	    CARDIAC MARKERS:                                  10.6   7.6   )-----------( 173      ( 24 Sep 2017 08:06 )             32.5     09-24    139  |  102  |  23  ----------------------------<  142<H>  4.6   |  22  |  0.97    Ca    9.5      24 Sep 2017 08:06    TPro  7.7  /  Alb  3.9  /  TBili  0.4  /  DBili  x   /  AST  20  /  ALT  12  /  AlkPhos  102  09-24    proBNP:   Lipid Profile:   HgA1c:   TSH:     ASSESSMENT/PLAN:

## 2020-01-01 ENCOUNTER — INPATIENT (INPATIENT)
Facility: HOSPITAL | Age: 85
LOS: 0 days | DRG: 871 | End: 2020-03-24
Attending: HOSPITALIST | Admitting: HOSPITALIST
Payer: MEDICARE

## 2020-01-01 VITALS
HEART RATE: 112 BPM | SYSTOLIC BLOOD PRESSURE: 82 MMHG | RESPIRATION RATE: 12 BRPM | DIASTOLIC BLOOD PRESSURE: 52 MMHG | OXYGEN SATURATION: 100 % | TEMPERATURE: 101 F

## 2020-01-01 VITALS
TEMPERATURE: 98 F | RESPIRATION RATE: 28 BRPM | SYSTOLIC BLOOD PRESSURE: 71 MMHG | DIASTOLIC BLOOD PRESSURE: 48 MMHG | HEART RATE: 82 BPM | OXYGEN SATURATION: 100 %

## 2020-01-01 DIAGNOSIS — I10 ESSENTIAL (PRIMARY) HYPERTENSION: ICD-10-CM

## 2020-01-01 DIAGNOSIS — J96.01 ACUTE RESPIRATORY FAILURE WITH HYPOXIA: ICD-10-CM

## 2020-01-01 DIAGNOSIS — F03.90 UNSPECIFIED DEMENTIA WITHOUT BEHAVIORAL DISTURBANCE: ICD-10-CM

## 2020-01-01 DIAGNOSIS — R63.8 OTHER SYMPTOMS AND SIGNS CONCERNING FOOD AND FLUID INTAKE: ICD-10-CM

## 2020-01-01 DIAGNOSIS — J18.9 PNEUMONIA, UNSPECIFIED ORGANISM: ICD-10-CM

## 2020-01-01 DIAGNOSIS — Z51.5 ENCOUNTER FOR PALLIATIVE CARE: ICD-10-CM

## 2020-01-01 LAB
APPEARANCE UR: ABNORMAL
APTT BLD: 50.9 SEC — HIGH (ref 27.5–36.3)
BASE EXCESS BLDA CALC-SCNC: -14.8 MMOL/L — LOW (ref -2–3)
BASOPHILS # BLD AUTO: 0 K/UL — SIGNIFICANT CHANGE UP (ref 0–0.2)
BASOPHILS NFR BLD AUTO: 0 % — SIGNIFICANT CHANGE UP (ref 0–2)
BILIRUB UR-MCNC: ABNORMAL
BLD GP AB SCN SERPL QL: NEGATIVE — SIGNIFICANT CHANGE UP
COLOR SPEC: ABNORMAL
CULTURE RESULTS: SIGNIFICANT CHANGE UP
DIFF PNL FLD: ABNORMAL
EOSINOPHIL # BLD AUTO: 0 K/UL — SIGNIFICANT CHANGE UP (ref 0–0.5)
EOSINOPHIL NFR BLD AUTO: 0 % — SIGNIFICANT CHANGE UP (ref 0–6)
GLUCOSE UR QL: NEGATIVE — SIGNIFICANT CHANGE UP
HCO3 BLDA-SCNC: 10 MMOL/L — LOW (ref 21–28)
HCT VFR BLD CALC: 15.8 % — CRITICAL LOW (ref 39–50)
HCT VFR BLD CALC: 32.7 % — LOW (ref 39–50)
HGB BLD-MCNC: 4.4 G/DL — CRITICAL LOW (ref 13–17)
HGB BLD-MCNC: 9.7 G/DL — LOW (ref 13–17)
INR BLD: 2.98 — HIGH (ref 0.88–1.16)
KETONES UR-MCNC: ABNORMAL MG/DL
LACTATE SERPL-SCNC: 1.7 MMOL/L — SIGNIFICANT CHANGE UP (ref 0.5–2)
LACTATE SERPL-SCNC: 4.8 MMOL/L — CRITICAL HIGH (ref 0.5–2)
LEUKOCYTE ESTERASE UR-ACNC: ABNORMAL
LYMPHOCYTES # BLD AUTO: 0.13 K/UL — LOW (ref 1–3.3)
LYMPHOCYTES # BLD AUTO: 1.7 % — LOW (ref 13–44)
MCHC RBC-ENTMCNC: 27.8 GM/DL — LOW (ref 32–36)
MCHC RBC-ENTMCNC: 29.3 PG — SIGNIFICANT CHANGE UP (ref 27–34)
MCHC RBC-ENTMCNC: 29.7 GM/DL — LOW (ref 32–36)
MCHC RBC-ENTMCNC: 29.8 PG — SIGNIFICANT CHANGE UP (ref 27–34)
MCV RBC AUTO: 100.3 FL — HIGH (ref 80–100)
MCV RBC AUTO: 105.3 FL — HIGH (ref 80–100)
MONOCYTES # BLD AUTO: 0.14 K/UL — SIGNIFICANT CHANGE UP (ref 0–0.9)
MONOCYTES NFR BLD AUTO: 1.8 % — LOW (ref 2–14)
NEUTROPHILS # BLD AUTO: 7.2 K/UL — SIGNIFICANT CHANGE UP (ref 1.8–7.4)
NEUTROPHILS NFR BLD AUTO: 92.9 % — HIGH (ref 43–77)
NITRITE UR-MCNC: POSITIVE
NRBC # BLD: 5 /100 WBCS — HIGH (ref 0–0)
NRBC # BLD: SIGNIFICANT CHANGE UP /100 WBCS (ref 0–0)
PCO2 BLDA: 19 MMHG — LOW (ref 35–48)
PH BLDA: 7.34 — LOW (ref 7.35–7.45)
PH UR: 6.5 — SIGNIFICANT CHANGE UP (ref 5–8)
PLATELET # BLD AUTO: 158 K/UL — SIGNIFICANT CHANGE UP (ref 150–400)
PLATELET # BLD AUTO: 78 K/UL — LOW (ref 150–400)
PO2 BLDA: 36 MMHG — CRITICAL LOW (ref 83–108)
PROT UR-MCNC: 100 MG/DL
PROTHROM AB SERPL-ACNC: 35.1 SEC — HIGH (ref 10–12.9)
RAPID RVP RESULT: SIGNIFICANT CHANGE UP
RBC # BLD: 1.5 M/UL — LOW (ref 4.2–5.8)
RBC # BLD: 3.26 M/UL — LOW (ref 4.2–5.8)
RBC # FLD: 16.1 % — HIGH (ref 10.3–14.5)
RBC # FLD: 16.1 % — HIGH (ref 10.3–14.5)
RH IG SCN BLD-IMP: POSITIVE — SIGNIFICANT CHANGE UP
SAO2 % BLDA: 58 % — LOW (ref 95–100)
SARS-COV-2 RNA SPEC QL NAA+PROBE: DETECTED
SP GR SPEC: 1.02 — SIGNIFICANT CHANGE UP (ref 1–1.03)
SPECIMEN SOURCE: SIGNIFICANT CHANGE UP
TROPONIN T SERPL-MCNC: 0.04 NG/ML — CRITICAL HIGH (ref 0–0.01)
UROBILINOGEN FLD QL: 1 E.U./DL — SIGNIFICANT CHANGE UP
WBC # BLD: 4.56 K/UL — SIGNIFICANT CHANGE UP (ref 3.8–10.5)
WBC # BLD: 7.6 K/UL — SIGNIFICANT CHANGE UP (ref 3.8–10.5)
WBC # FLD AUTO: 4.56 K/UL — SIGNIFICANT CHANGE UP (ref 3.8–10.5)
WBC # FLD AUTO: 7.6 K/UL — SIGNIFICANT CHANGE UP (ref 3.8–10.5)

## 2020-01-01 PROCEDURE — 99292 CRITICAL CARE ADDL 30 MIN: CPT | Mod: 25

## 2020-01-01 PROCEDURE — 99233 SBSQ HOSP IP/OBS HIGH 50: CPT | Mod: GC

## 2020-01-01 PROCEDURE — 99223 1ST HOSP IP/OBS HIGH 75: CPT | Mod: GC

## 2020-01-01 PROCEDURE — 99291 CRITICAL CARE FIRST HOUR: CPT | Mod: 25

## 2020-01-01 PROCEDURE — 31500 INSERT EMERGENCY AIRWAY: CPT

## 2020-01-01 PROCEDURE — 71045 X-RAY EXAM CHEST 1 VIEW: CPT | Mod: 26

## 2020-01-01 RX ORDER — AZITHROMYCIN 500 MG/1
500 TABLET, FILM COATED ORAL ONCE
Refills: 0 | Status: DISCONTINUED | OUTPATIENT
Start: 2020-01-01 | End: 2020-01-01

## 2020-01-01 RX ORDER — FENTANYL CITRATE 50 UG/ML
0.5 INJECTION INTRAVENOUS
Qty: 2500 | Refills: 0 | Status: DISCONTINUED | OUTPATIENT
Start: 2020-01-01 | End: 2020-01-01

## 2020-01-01 RX ORDER — SODIUM CHLORIDE 9 MG/ML
1000 INJECTION INTRAMUSCULAR; INTRAVENOUS; SUBCUTANEOUS ONCE
Refills: 0 | Status: COMPLETED | OUTPATIENT
Start: 2020-01-01 | End: 2020-01-01

## 2020-01-01 RX ORDER — VANCOMYCIN HCL 1 G
1000 VIAL (EA) INTRAVENOUS ONCE
Refills: 0 | Status: COMPLETED | OUTPATIENT
Start: 2020-01-01 | End: 2020-01-01

## 2020-01-01 RX ORDER — PIPERACILLIN AND TAZOBACTAM 4; .5 G/20ML; G/20ML
3.38 INJECTION, POWDER, LYOPHILIZED, FOR SOLUTION INTRAVENOUS ONCE
Refills: 0 | Status: COMPLETED | OUTPATIENT
Start: 2020-01-01 | End: 2020-01-01

## 2020-01-01 RX ORDER — HYDROMORPHONE HYDROCHLORIDE 2 MG/ML
0.5 INJECTION INTRAMUSCULAR; INTRAVENOUS; SUBCUTANEOUS
Qty: 10 | Refills: 0 | Status: DISCONTINUED | OUTPATIENT
Start: 2020-01-01 | End: 2020-01-01

## 2020-01-01 RX ORDER — ACETAMINOPHEN 500 MG
650 TABLET ORAL ONCE
Refills: 0 | Status: COMPLETED | OUTPATIENT
Start: 2020-01-01 | End: 2020-01-01

## 2020-01-01 RX ORDER — NOREPINEPHRINE BITARTRATE/D5W 8 MG/250ML
0.05 PLASTIC BAG, INJECTION (ML) INTRAVENOUS
Qty: 8 | Refills: 0 | Status: DISCONTINUED | OUTPATIENT
Start: 2020-01-01 | End: 2020-01-01

## 2020-01-01 RX ORDER — PROPOFOL 10 MG/ML
10 INJECTION, EMULSION INTRAVENOUS
Qty: 500 | Refills: 0 | Status: DISCONTINUED | OUTPATIENT
Start: 2020-01-01 | End: 2020-01-01

## 2020-01-01 RX ORDER — MIDAZOLAM HYDROCHLORIDE 1 MG/ML
0.02 INJECTION, SOLUTION INTRAMUSCULAR; INTRAVENOUS
Qty: 100 | Refills: 0 | Status: DISCONTINUED | OUTPATIENT
Start: 2020-01-01 | End: 2020-01-01

## 2020-01-01 RX ORDER — MORPHINE SULFATE 50 MG/1
4 CAPSULE, EXTENDED RELEASE ORAL ONCE
Refills: 0 | Status: DISCONTINUED | OUTPATIENT
Start: 2020-01-01 | End: 2020-01-01

## 2020-01-01 RX ORDER — MORPHINE SULFATE 50 MG/1
2 CAPSULE, EXTENDED RELEASE ORAL
Qty: 100 | Refills: 0 | Status: DISCONTINUED | OUTPATIENT
Start: 2020-01-01 | End: 2020-01-01

## 2020-01-01 RX ADMIN — SODIUM CHLORIDE 1000 MILLILITER(S): 9 INJECTION INTRAMUSCULAR; INTRAVENOUS; SUBCUTANEOUS at 23:00

## 2020-01-01 RX ADMIN — SODIUM CHLORIDE 1000 MILLILITER(S): 9 INJECTION INTRAMUSCULAR; INTRAVENOUS; SUBCUTANEOUS at 00:26

## 2020-01-01 RX ADMIN — Medication 250 MILLIGRAM(S): at 00:15

## 2020-01-01 RX ADMIN — Medication 6.56 MICROGRAM(S)/KG/MIN: at 00:54

## 2020-01-01 RX ADMIN — Medication 650 MILLIGRAM(S): at 00:00

## 2020-01-01 RX ADMIN — PIPERACILLIN AND TAZOBACTAM 200 GRAM(S): 4; .5 INJECTION, POWDER, LYOPHILIZED, FOR SOLUTION INTRAVENOUS at 00:55

## 2020-01-01 RX ADMIN — MORPHINE SULFATE 2 MG/HR: 50 CAPSULE, EXTENDED RELEASE ORAL at 05:11

## 2020-01-01 RX ADMIN — Medication 650 MILLIGRAM(S): at 23:00

## 2020-01-01 RX ADMIN — MORPHINE SULFATE 4 MILLIGRAM(S): 50 CAPSULE, EXTENDED RELEASE ORAL at 02:30

## 2020-01-01 RX ADMIN — MORPHINE SULFATE 2 MG/HR: 50 CAPSULE, EXTENDED RELEASE ORAL at 06:11

## 2020-02-07 NOTE — PATIENT PROFILE ADULT. - FALLEN IN THE PAST
----- Message from Marlon Ballesteros MD sent at 2/7/2020  8:42 AM CST -----  Contact: Patient  Could use verapamil ER tablet 120 mg PO BID.    ----- Message -----  From: Homero Perales MA  Sent: 2/7/2020   7:57 AM CST  To: Marlon Ballesteros MD    Please advise  ----- Message -----  From: Deborah Russell RN  Sent: 2/6/2020   3:57 PM CST  To: BRYANT Jimenez it is ok to change it, but the tablets are not time release so she will have to take it more than once a day. Send Lesli a message and ask him how he wants her dose divided for the immediate release tablets.    ----- Message -----  From: Homero Perales MA  Sent: 2/6/2020   3:06 PM CST  To: Deborah Russell RN    Is it ok to change the verapamil? im not understanding this message clearly, but I will if its ok.   ----- Message -----  From: Kira Cely  Sent: 2/6/2020   2:53 PM CST  To: Lesli BELL Staff    The pt is calling to get a refill on her chlorthalidone (HYGROTEN) 25 MG Tab and  verapamil (VERELAN) 180 MG C24P and she needs 180 ER table because its cheaper and her insurance will pay for it. Please send it to [facility] - [address] [phone] (Phone) [phone] (Fax). Thanks, Kira                    yes

## 2020-03-22 NOTE — ED ADULT NURSE NOTE - OBJECTIVE STATEMENT
88 Y/O male biba from nursing home for fever and difficulty breathing.  Pt. noted to have difficulty breathing, blue/dark color to skin, mottling to both lower legs.  Capillary refill 5 seconds to both feet.  Pt. received on a non rebreather and immediately prepared for intubation.  Pt. placed on cm, Sp02 monitoring, RT called to bedside, iv line placed to the right forearm, pt. was medicated as per orders with Etomidate and Succinylcholine and pt. intubated by MD Atwood at 1050pm.  Vent settings, Fio2 100 %, Peep 5, RR 15, , lip level 24, 7.5 ET tube.  Pt. kept under airborne isolation for R/0 COVID-19.  X RAY completed after intubation to confirm placement.  NG tube inserted by MD Atwood to the left nare.

## 2020-03-22 NOTE — ED ADULT NURSE NOTE - NSIMPLEMENTINTERV_GEN_ALL_ED
Implemented All Fall Risk Interventions:  [place] to call system. Call bell, personal items and telephone within reach. Instruct patient to call for assistance. Room bathroom lighting operational. Non-slip footwear when patient is off stretcher. Physically safe environment: no spills, clutter or unnecessary equipment. Stretcher in lowest position, wheels locked, appropriate side rails in place. Provide visual cue, wrist band, yellow gown, etc. Monitor gait and stability. Monitor for mental status changes and reorient to person, place, and time. Review medications for side effects contributing to fall risk. Reinforce activity limits and safety measures with patient and family.

## 2020-03-23 PROBLEM — F03.90 UNSPECIFIED DEMENTIA, UNSPECIFIED SEVERITY, WITHOUT BEHAVIORAL DISTURBANCE, PSYCHOTIC DISTURBANCE, MOOD DISTURBANCE, AND ANXIETY: Chronic | Status: ACTIVE | Noted: 2017-09-24

## 2020-03-23 PROBLEM — I10 ESSENTIAL (PRIMARY) HYPERTENSION: Chronic | Status: ACTIVE | Noted: 2017-09-24

## 2020-03-23 NOTE — H&P ADULT - NSHPLABSRESULTS_GEN_ALL_CORE
LABS:                         4.4    4.56  )-----------( 78       ( 23 Mar 2020 00:32 )             15.8           PT/INR - ( 22 Mar 2020 23:09 )   PT: 35.1 sec;   INR: 2.98          PTT - ( 22 Mar 2020 23:09 )  PTT:50.9 sec  Urinalysis Basic - ( 23 Mar 2020 00:32 )    Color: Brown / Appearance: Turbid / S.025 / pH: x  Gluc: x / Ketone: Trace mg/dL  / Bili: Moderate / Urobili: 1.0 E.U./dL   Blood: x / Protein: 100 mg/dL / Nitrite: POSITIVE   Leuk Esterase: Small / RBC: > 10 /HPF / WBC 5-10 /HPF   Sq Epi: x / Non Sq Epi: 0-5 /HPF / Bacteria: Present /HPF      CARDIAC MARKERS ( 22 Mar 2020 23:09 )  x     / SEE NOTE ng/mL / SEE NOTE U/L / x     / x            Lactate, Blood: 1.7 mmol/L ( @ 00:33)  Lactate, Blood: 4.8 mmol/L ( @ 23:02)      RADIOLOGY, EKG & ADDITIONAL TESTS: Reviewed.

## 2020-03-23 NOTE — ED PROVIDER NOTE - PHYSICAL EXAMINATION
CONST: chronically illl appearing elderly male frail w/ increased wob/tachypnea  HEAD: atraumatic  EYES: conjunctivae clear  ENT: dry mucous membranes, oropharnx clear  NECK: severe kyphosis, supple, no jvd  CARD: sinus tachy regular no murmurs  CHEST: +bl rales/rhonchi, tahcypnea, retractions  ABD: soft, nd  : no rash  RECTAL: dark brown stool  EXT: FROM, symmetric distal pulses intact  SKIN: warm, dry, no rash, no pedal edema, cap refill <2sec  NEURO: alert,

## 2020-03-23 NOTE — H&P ADULT - ASSESSMENT
Mr. Vital is a 89M with HTN, dementia who was BIBEMS from [facility] for acute hypoxic respiratory failure, initially intubated, but after discussion with family, pt palliatively extubated, now comfort measures.

## 2020-03-23 NOTE — CONSULT NOTE ADULT - ASSESSMENT
89 year old male with PMH HTN and dementia who was brought in from [facility] for hypoxia.    #Hypoxic respiratory failure  -The patient was having 2-3 days of cough with fever of 101, increased work of breathing and hypoxia to the 60s and was intubated on arrival.  With hemoglobin 4.4, hypoxic respiratory failure, septic shock and poor baseline functional status, CCM team spoke with patient's family (see goals of care note) and decided best course of action would be palliative extubation.  Patient to be palliatively extubated and to be admitted to [facility].

## 2020-03-23 NOTE — H&P ADULT - ATTENDING COMMENTS
patient seen and examined overnight on night of admission  reviewed pertinent data, h&p; PE findings as above   a/f respiratory failure 2/2 PNA, r/o COVID-19 s/p intubation; extubated per family wishes per ICU GOC discussion w/ family ; comfort care measures only, followup

## 2020-03-23 NOTE — ED ADULT NURSE REASSESSMENT NOTE - NS ED NURSE REASSESS COMMENT FT1
Pt. was extubated at 0230 am with MD Atwood and RT at the bedside to perform the extubation as per the request of the family.  Pt. is tolerating procedure well at this time, and is maintaining his respiratory system.  Pt. placed on 4L 02 via NC.  Will continue to monitor.

## 2020-03-23 NOTE — CONSULT NOTE ADULT - ATTENDING COMMENTS
advanced dementia, bedridden, shock with hypotension requiring high dose levophed pressor, intubated on arrival to ED, acute respiraotry failure. likely recent exposure to COVID at NH. 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 discussed with next of kin Evelyn Vital and other next of kin including power of , that his prognosis is extremely poor and recovery to previous state of function extemely unlikely. after consideration, next of kin decided to terminally extubate and provide comfort care only. this is to be done in ED as discussed with Dr. Atwood ED attending. will be admitted to [facility] r/o COVID floor as comfort care with pain medication as needed to prevent suffering as discussed with next of kin. advanced dementia, bedridden, shock with hypotension requiring high dose levophed pressor, intubated on arrival to ED, acute respiraotry failure. likely recent exposure to COVID at NH. in [facility], on exam he has an acute abdomen, rigid and silent, tender to palpation, with brown colored thick urine cw entero-cystic fistula/abscess. discussed with next of kin Evelyn Vital and other next of kin including power of , that his prognosis is extremely poor and recovery to previous state of function extemely unlikely. after consideration, next of kin decided to terminally extubate and provide comfort care only. this is to be done in ED as discussed with Dr. Atwood ED attending. will be admitted to [facility] r/o COVID floor as comfort care with pain medication as needed to prevent suffering as discussed with next of kin.    see also goals of care conversation note today authored by Dr. Jin.

## 2020-03-23 NOTE — H&P ADULT - NSHPPHYSICALEXAM_GEN_ALL_CORE
VITAL SIGNS:  T(C): 37.2 (03-23-20 @ 01:05), Max: 38.3 (03-22-20 @ 22:45)  T(F): 99 (03-23-20 @ 01:05), Max: 101 (03-22-20 @ 22:45)  HR: 85 (03-23-20 @ 01:05) (73 - 112)  BP: 152/82 (03-23-20 @ 01:05) (82/52 - 152/82)  BP(mean): --  RR: 13 (03-23-20 @ 01:05) (12 - 13)  SpO2: 100% (03-23-20 @ 01:05) (100% - 100%)  Wt(kg): --    PHYSICAL EXAM:    Constitutional: WDWN resting comfortably in bed; NAD  Head: NC/AT  Eyes: PERRL, EOMI, anicteric sclera  ENT: no nasal discharge; uvula midline, no oropharyngeal erythema or exudates; MMM  Neck: supple; no JVD or thyromegaly  Respiratory: CTA B/L; no W/R/R, no retractions  Cardiac: +S1/S2; RRR; no M/R/G; PMI non-displaced  Gastrointestinal: abdomen soft, NT/ND; no rebound or guarding; +BSx4  Genitourinary: normal external genitalia  Back: spine midline, no bony tenderness or step-offs; no CVAT B/L  Extremities: WWP, no clubbing or cyanosis; no peripheral edema  Musculoskeletal: NROM x4; no joint swelling, tenderness or erythema  Vascular: 2+ radial, femoral, DP/PT pulses B/L  Dermatologic: skin warm, dry and intact; no rashes, wounds, or scars  Lymphatic: no submandibular or cervical LAD  Neurologic: AAOx3; CNII-XII grossly intact; no focal deficits  Psychiatric: affect and characteristics of appearance, verbalizations, behaviors are appropriate VITAL SIGNS:  T(C): 37.2 (03-23-20 @ 01:05), Max: 38.3 (03-22-20 @ 22:45)  T(F): 99 (03-23-20 @ 01:05), Max: 101 (03-22-20 @ 22:45)  HR: 85 (03-23-20 @ 01:05) (73 - 112)  BP: 152/82 (03-23-20 @ 01:05) (82/52 - 152/82)  BP(mean): --  RR: 13 (03-23-20 @ 01:05) (12 - 13)  SpO2: 100% (03-23-20 @ 01:05) (100% - 100%)  Wt(kg): --    PHYSICAL EXAM:    Constitutional: Elderly male, cachectic, ill-appearing  Head: NC/AT  Eyes: PERRL, EOMI, arcus senilis  ENT: dry MM, NRB in place  Respiratory: coarse rhonchi diffusely  Cardiac: +S1/S2; RRR  Gastrointestinal: abdomen rigid  Genitourinary: glynn draining thick brown urine  Extremities: cool to touch, no clubbing or cyanosis; no peripheral edema  Vascular: 2+ radial, femoral, DP/PT pulses B/L  Dermatologic: skin cool, dry and intact; no rashes, wounds, or scars  Lymphatic: no submandibular or cervical LAD  Neurologic: AAOx0; non-verbal

## 2020-03-23 NOTE — ED PROCEDURE NOTE - CPROC ED TRACHE INTUB DETAIL1
Difficult/crash intubation (see additional details section)./Patient was pre-oxygenated. An endotracheal tube (ETT) was placed through the vocal cords into the trachea.  ETT position was confirmed by auscultation of bilateral breath sounds to all lung fields. ETCO2 level was appropriate./During intubation, applied gentle pressure to the cricoid cartilage./Patient connected to ventilator with settings as ordered.

## 2020-03-23 NOTE — H&P ADULT - NSICDXPASTMEDICALHX_GEN_ALL_CORE_FT
PAST MEDICAL HISTORY:  Dementia without behavioral disturbance, unspecified dementia type     Essential hypertension

## 2020-03-23 NOTE — H&P ADULT - HISTORY OF PRESENT ILLNESS
Mr. Vital is a 89M with HTN, dementia who was BIBEMS from [facility] for acute respiratory failure. Per EMS, pt was coughing for 2-3 days, developed a fever (Tmax 101) associated with altered mental status, and then developed increased work of breathing and hypoxia on room air that improved on non-rebreather. While in ED, pt developed multiorgan failure, he became hypotensive, was intubated and placed on levophed. MICU was consulted. ICU fellow and attending then had discussions with pt's family, and pt was made DNR/DNI, and decision was made to perform a palliative extubation and focus on comfort measures. Mr. Vital is a 89M with HTN, dementia who was BIBEMS from [facility] for acute respiratory failure. Per EMS, pt was coughing for 2-3 days, developed a fever (Tmax 101) associated with altered mental status, and then developed increased work of breathing and hypoxia on room air that improved on non-rebreather. Upon arrival to the ED, vital signs were BP 82/52, , RR 12, temperature 101 degrees Farenheit and saturating 100% on NRB.  After labs were drawn patient was intubated.  Labs were significant for hemoglobin 9.7 (decreased to 4.4 on repeat), platelets 158 (repeat 78), INR 2.98, lactate 4.8 (resolved to 1.7), ABG with pH 7.34, CO2 19, PO2 36, HCO3 10, arterial saturation 58, UA positive for nitrite and small leukocyte esterase and 5-10 WBC.  He received zosyn 3.375g, 1g vancomycin, 2L NS, 650mg rectal tylenol.  He was started on sedation which dropped his blood pressures at which point levophed was started.  ICU fellow and attending then had discussions with pt's family, and pt was made DNR/DNI, and decision was made to perform a palliative extubation and focus on comfort measures. Mr. Vital is a 89M with HTN, advanced dementia who was BIBEMS from [facility] for acute respiratory failure. Per EMS, pt was coughing for 2-3 days, developed a fever (Tmax 101) associated with altered mental status, and then developed increased work of breathing and hypoxia on room air that improved on non-rebreather. Upon arrival to the ED, vital signs were BP 82/52, , RR 12, temperature 101 degrees Farenheit and saturating 100% on NRB.  After labs were drawn patient was intubated.  Labs were significant for hemoglobin 9.7 (decreased to 4.4 on repeat), platelets 158 (repeat 78), INR 2.98, lactate 4.8 (resolved to 1.7), ABG with pH 7.34, CO2 19, PO2 36, HCO3 10, arterial saturation 58, UA positive for nitrite and small leukocyte esterase and 5-10 WBC.  He received zosyn 3.375g, 1g vancomycin, 2L NS, 650mg rectal tylenol.  He was started on sedation which dropped his blood pressures at which point levophed was started.  ICU fellow and attending then had discussions with pt's family, and pt was made DNR/DNI, and decision was made to perform a palliative extubation and focus on comfort measures.

## 2020-03-23 NOTE — ED PROVIDER NOTE - PROGRESS NOTE DETAILS
upon arrival, pt found in acute resp failure w/ ams, sbp 80s, sinus tachy 110s, hypoxia mid-60%s on RA improved to low 90%s on nrb but w/ increased WOB/tachypnea/retractions/rales. pt full code. s/p successful 1st pass intubation w/ etomidate/succ. postintubation sedation w/ propofol. however, pt requiring higher levels of sedation w/ worsening sbp 60/maps 40s despite 2L ivf. switched to fentanyl/versed and levophed started. labs/ekg/cxr/abx/ivf/covid/rvp. micu consulted. will do central line. while doing central line for levophed gtt, micu reports family has made pt dnr/dni w/ palliative extubation. central line procedure aborted. reccs to admit to [facility] region under service for palliative extubation. family requesting to be notified of covid19 results given poss family exposure.

## 2020-03-23 NOTE — ED PROVIDER NOTE - OBJECTIVE STATEMENT
89M ?phx/?Rx biba from [facility] c/o acute resp failure. per ems, pt w/ 2-3d cough and now <24h fever (tmax 101), ams, increased work of breathing, hypoxia mid 60%s on RA improved to low-90s on NRB.

## 2020-03-23 NOTE — CONSULT NOTE ADULT - SUBJECTIVE AND OBJECTIVE BOX
[facility] SERVICE CONSULTATION NOTE    CC: Hypoxic respiratory failure    HPI:  89 year old male with PMH HTN and dementia who was brought in from [facility] for hypoxia.  The patient is currently sedated and intubated so history obtained from ED provider.  The patient was having 2-3 days of cough with fever of 101, increased work of breathing and hypoxia to the 60s.  He was placed on NRB and brought to the hospital.  Upon arrival to the ED, vital signs were BP 82/52, , RR 12, temperature 101 degrees Farenheit and saturating 100% on NRB.  After labs were drawn patient was intubated.  Labs were significant for hemoglobin 9.7 (decreased to 4.4 on repeat), platelets 158 (repeat 78), INR 2.98, lactate 4.8 (resolved to 1.7), ABG with pH 7.34, CO2 19, PO2 36, HCO3 10, arterial saturation 58, UA positive for nitrite and small leukocyte esterase and 5-10 WBC.  He received zosyn 3.375g, 1g vancomycin, 2L NS, 650mg rectal tylenol.  He was started on sedation which dropped his blood pressures at which point levophed was started.  The ICU was consulted for management of hypoxic respiratory failure.    ROS:  Unable to obtain due to patient clinical status    PMH: HTN, dementia    PSH: Unable to obtain    FH: Unable to obtain    SH: Resides at [facility]    ALLERGIES: NKDA    MEDICATIONS: Unable to obtain    VITAL SIGNS:  ICU Vital Signs Last 24 Hrs  T(C): 37.2 (23 Mar 2020 01:05), Max: 38.3 (22 Mar 2020 22:45)  T(F): 99 (23 Mar 2020 01:05), Max: 101 (22 Mar 2020 22:45)  HR: 85 (23 Mar 2020 01:05) (73 - 112)  BP: 152/82 (23 Mar 2020 01:05) (82/52 - 152/82)  BP(mean): --  ABP: --  ABP(mean): --  RR: 13 (23 Mar 2020 01:05) (12 - 13)  SpO2: 100% (23 Mar 2020 01:05) (100% - 100%)    CAPILLARY BLOOD GLUCOSE          PHYSICAL EXAM:  Constitutional: resting comfortably in bed, NAD  HEENT: NC/AT; PERRL, anicteric sclera; no oropharyngeal erythema or exudates; MMM  Neck: supple, no appreciable JVD  Respiratory: CTA B/L, no W/R/R; respirations appear non-labored, conversive in full sentences  Cardiovascular: +S1/S2, RRR  Gastrointestinal: abdomen soft, NT/ND  Extremities: WWP; no clubbing, cyanosis or edema  Vascular: 2+ radial, femoral, and DP/PT pulses B/L  Dermatologic: skin normal color and turgor; no visible rashes  Neurological:     LABS:                        4.4    4.56  )-----------( 78       ( 23 Mar 2020 00:32 )             15.8           PT/INR - ( 22 Mar 2020 23:09 )   PT: 35.1 sec;   INR: 2.98          PTT - ( 22 Mar 2020 23:09 )  PTT:50.9 sec  Lactate, Blood: 1.7 mmol/L (20 @ 00:33)  Lactate, Blood: 4.8 mmol/L (20 @ 23:02)    CARDIAC MARKERS ( 22 Mar 2020 23:09 )  x     / SEE NOTE ng/mL / SEE NOTE U/L / x     / x          Urinalysis Basic - ( 23 Mar 2020 00:32 )    Color: Brown / Appearance: Turbid / S.025 / pH: x  Gluc: x / Ketone: Trace mg/dL  / Bili: Moderate / Urobili: 1.0 E.U./dL   Blood: x / Protein: 100 mg/dL / Nitrite: POSITIVE   Leuk Esterase: Small / RBC: > 10 /HPF / WBC 5-10 /HPF   Sq Epi: x / Non Sq Epi: 0-5 /HPF / Bacteria: Present /HPF          EKG: Reviewed.    RADIOLOGY & ADDITIONAL TESTS: Reviewed. [facility] SERVICE CONSULTATION NOTE    CC: Hypoxic respiratory failure    HPI:  89 year old male with PMH HTN and dementia who was brought in from [facility] for hypoxia.  The patient is currently sedated and intubated so history obtained from ED provider.  The patient was having 2-3 days of cough with fever of 101, increased work of breathing and hypoxia to the 60s.  He was placed on NRB and brought to the hospital.  Upon arrival to the ED, vital signs were BP 82/52, , RR 12, temperature 101 degrees Farenheit and saturating 100% on NRB.  After labs were drawn patient was intubated.  Labs were significant for hemoglobin 9.7 (decreased to 4.4 on repeat), platelets 158 (repeat 78), INR 2.98, lactate 4.8 (resolved to 1.7), ABG with pH 7.34, CO2 19, PO2 36, HCO3 10, arterial saturation 58, UA positive for nitrite and small leukocyte esterase and 5-10 WBC.  He received zosyn 3.375g, 1g vancomycin, 2L NS, 650mg rectal tylenol.  He was started on sedation which dropped his blood pressures at which point levophed was started.  The ICU was consulted for management of hypoxic respiratory failure.    ROS:  Unable to obtain due to patient clinical status    PMH: HTN, dementia    PSH: Unable to obtain    FH: Unable to obtain    SH: Resides at [facility]    ALLERGIES: NKDA    MEDICATIONS: Unable to obtain    VITAL SIGNS:  ICU Vital Signs Last 24 Hrs  T(C): 37.2 (23 Mar 2020 01:05), Max: 38.3 (22 Mar 2020 22:45)  T(F): 99 (23 Mar 2020 01:05), Max: 101 (22 Mar 2020 22:45)  HR: 85 (23 Mar 2020 01:05) (73 - 112)  BP: 152/82 (23 Mar 2020 01:05) (82/52 - 152/82)  BP(mean): --  ABP: --  ABP(mean): --  RR: 13 (23 Mar 2020 01:05) (12 - 13)  SpO2: 100% (23 Mar 2020 01:05) (100% - 100%)    CAPILLARY BLOOD GLUCOSE          PHYSICAL EXAM:  Constitutional: Chronically ill appearing male laying in bed, sedated and intubated  HEENT: Bitemporal wasting  Respiratory: Diffuse rhonchi bilaterally  Cardiovascular: +S1/S2, RRR, no murmurs, rubs or gallops  Gastrointestinal: Rigid abdomen  Genitourinary: Brown urine noted in glynn catheter  Extremities: Cool, no LE edema  Vascular: 1+ radial, femoral, and DP/PT pulses B/L  Neurological: Patient sedated and intubated    LABS:                        4.4    4.56  )-----------( 78       ( 23 Mar 2020 00:32 )             15.8           PT/INR - ( 22 Mar 2020 23:09 )   PT: 35.1 sec;   INR: 2.98          PTT - ( 22 Mar 2020 23:09 )  PTT:50.9 sec  Lactate, Blood: 1.7 mmol/L (20 @ 00:33)  Lactate, Blood: 4.8 mmol/L (20 @ 23:02)    CARDIAC MARKERS ( 22 Mar 2020 23:09 )  x     / SEE NOTE ng/mL / SEE NOTE U/L / x     / x          Urinalysis Basic - ( 23 Mar 2020 00:32 )    Color: Brown / Appearance: Turbid / S.025 / pH: x  Gluc: x / Ketone: Trace mg/dL  / Bili: Moderate / Urobili: 1.0 E.U./dL   Blood: x / Protein: 100 mg/dL / Nitrite: POSITIVE   Leuk Esterase: Small / RBC: > 10 /HPF / WBC 5-10 /HPF   Sq Epi: x / Non Sq Epi: 0-5 /HPF / Bacteria: Present /HPF          EKG: Reviewed.    RADIOLOGY & ADDITIONAL TESTS: Reviewed.

## 2020-03-23 NOTE — H&P ADULT - PROBLEM SELECTOR PLAN 1
Pt presented from [facility] with fever, hypoxic respiratory failure. He was initially intubated in ED, but after discussion with family, pt made DNR/DNI, and palliative extubation performed.  - comfort measures  - morphine at 2mL/hr  - can give additional 2mg morphine IVP or ativan for increased comfort Pt presented from [facility] with fever, hypoxic respiratory failure. He was initially intubated in ED, developed hypotension requiring levophed. Discussion had between ICU team and pt's next of Kin, Evelyn Vital, and decision was made to perform terminal extubation and perform comfort measures. Pt will be admitted RMF.  - comfort measures  - morphine at 2mL/hr  - can give additional 2mg morphine IVP or ativan for increased comfort

## 2020-03-23 NOTE — GOALS OF CARE CONVERSATION - ADVANCED CARE PLANNING - CONVERSATION DETAILS
Dr. Velazquez called Ms. Robertson and Evelyn Vital to discuss patient's overall critical condition and multi-organ system failure in the background of significant underlying medical problems, frailty/cachexia, and dementia and conveyed his grave prognosis. Family understood patient was unlikely to survive even with aggressive treatment and may confer further suffering to the patient. At the time of Dr. Velazquez's phone conversation, they wanted to first consult with the remainder of Oleksandr's immediate family members before making a decision on goals of care.    Around 0130hrs, Ms. Evelyn Vital called our CCM team back and spoke with this writer, saying Oleksandr's family is fully in agreement that he would not want his life to be prolonged artificially nor would he wish to suffer in such a critically ill state. Evelyn requested we "allow nature to take its course" and to stop all artifical life support and instead focus on ensuring his comfort in his end of life (EOL). This writer clarified that Evelyn and Mr. Vital's family would want the ventilator discontinued and the breathing tube removed in line with the aforementioned wishes, and they confirmed.     The above was discussed with remainder of CCM/Medical ICU team and will be relayed to ED staff.

## 2020-03-24 NOTE — PROGRESS NOTE ADULT - PROBLEM SELECTOR PLAN 1
Pt presented from [facility] with fever, hypoxic respiratory failure. He was initially intubated in ED, developed hypotension requiring levophed. Discussion had between ICU team and pt's next of Kin, Evelyn Vital, and decision was made to perform terminal extubation and perform comfort measures. Pt will be admitted RMF.  - comfort measures  - morphine at 2mL/hr  - can give additional 2mg morphine IVP or ativan for increased comfort Pt presented from [facility] with fever, hypoxic respiratory failure. He was initially intubated in ED, developed hypotension requiring levophed. Discussion had between ICU team and pt's next of Kin, Evelyn Vital, and decision was made to perform terminal extubation and perform comfort measures. Pt will be admitted RM.  - comfort measures (MEWS exempt)  - morphine at 2mL/hr  - can give additional 2mg morphine IVP or ativan for increased comfort

## 2020-03-24 NOTE — DISCHARGE NOTE PROVIDER - HOSPITAL COURSE
#Discharge: do not delete    Mr. Vital is a 89M DNR/DNI with HTN, dementia who was BIBEMS from [facility] for acute hypoxic respiratory failure, initially intubated, but after discussion with family, pt palliatively extubated, now comfort measures.                Problem List/Main Diagnoses (system-based)/hospital course:        #Septic Shock: see plan below         #Acute respiratory failure with hypoxia- Pt presented from [facility] with fever, hypoxic respiratory failure. He was initially intubated in ED, developed hypotension requiring levophed. Discussion had between ICU team and pt's next of Kin, Evelyn Vital, and decision was made to perform terminal extubation and perform comfort measures. Pt will be admitted [facility].    - comfort measures    - morphine at 2mL/hr    - COVID-19 positive     - DNR/DNI    - d/c back to [facility] rehab to likely         #SARS-Corona Virus Infection- see plan above         #Advanced dementia- likely degenerative changes/alzheimers    -pt unresponsive to verbal stimuli, responds to noxious stimuli         #Goals of care- see plan above                 New medications: none    Labs to be followed outpatient: none    Exam to be followed outpatient: none

## 2020-03-24 NOTE — PROGRESS NOTE ADULT - ASSESSMENT
Mr. Vital is a 89M with HTN, dementia who was BIBEMS from [facility] for acute hypoxic respiratory failure, initially intubated, but after discussion with family, pt palliatively extubated, now comfort measures.

## 2020-03-24 NOTE — DISCHARGE NOTE PROVIDER - NSDCMRMEDTOKEN_GEN_ALL_CORE_FT
acetaminophen 325 mg oral tablet: 2 tab(s) orally every 6 hours  Do not administer more than 4,000mg in a day  bisacodyl 10 mg rectal suppository: 1 suppository(ies) rectal once a day, As needed, If no bowel movement by POD#2  docusate sodium 100 mg oral capsule: 1 cap(s) orally 3 times a day  polyethylene glycol 3350 oral powder for reconstitution: 17 gram(s) orally once a day  senna oral tablet: 2 tab(s) orally once a day (at bedtime), As needed, Constipation

## 2020-03-24 NOTE — DISCHARGE NOTE PROVIDER - NSDCCPCAREPLAN_GEN_ALL_CORE_FT
PRINCIPAL DISCHARGE DIAGNOSIS  Diagnosis: Acute respiratory failure  Assessment and Plan of Treatment: You were found to be in respiratory failure and septic shock secondary to corona virus infection and likely community acquired bacterial pneumonia. We had an extensive conversation with your next of kin and have decided to deescalate your care and place you on a morphine drip as comfort care measures.      SECONDARY DISCHARGE DIAGNOSES  Diagnosis: SARS-associated coronavirus exposure  Assessment and Plan of Treatment: There was concern that you may have the novel new coronavirus, also known as COVID-19. This test resulted from a nasal swab that was done earlier in your admission to [facility]. Your symptoms improved dramatically during your hospital stay. Your test came back positive, the treatment is supportive care, which means: rest, hydration, and maintaining a good healthy diet. You may take tylenol if you experience any fevers and Robitussin for cough.       Diagnosis: Septic shock  Assessment and Plan of Treatment:

## 2020-03-24 NOTE — PROGRESS NOTE ADULT - SUBJECTIVE AND OBJECTIVE BOX
INTERVAL HPI/OVERNIGHT EVENTS:  Pt seen and examined at bedside, as per nurse no over night events. Cannot obtain ROS d/t neurological status of pt.       VITAL SIGNS:  T(F): --  HR: --  BP: --  RR: --  SpO2: --  Wt(kg): --      20 @ 07:01  -  20 @ 07:00  --------------------------------------------------------  IN: 24 mL / OUT: 500 mL / NET: -476 mL          PHYSICAL EXAM:    Constitutional: Elderly male, cachectic, ill-appearing  Head: NC/AT  Eyes: PERRL, EOMI, arcus senilis  ENT: dry MM, NRB in place  Respiratory: coarse rhonchi diffusely  Cardiac: +S1/S2; RRR  Gastrointestinal: abdomen rigid  Genitourinary: glynn draining thick brown urine  Extremities: cool to touch, no clubbing or cyanosis; no peripheral edema  Vascular: 2+ radial, femoral, DP/PT pulses B/L  Dermatologic: skin cool, dry and intact; no rashes, wounds, or scars  Lymphatic: no submandibular or cervical LAD  Neurologic: AAOx0; non-verbal    MEDICATIONS  (STANDING):  morphine  Infusion 2 mG/Hr (2 mL/Hr) IV Continuous <Continuous>    MEDICATIONS  (PRN):      Allergies    No Known Allergies    Intolerances        LABS:                        4.4    4.56  )-----------( 78       ( 23 Mar 2020 00:32 )             15.8           PT/INR - ( 22 Mar 2020 23:09 )   PT: 35.1 sec;   INR: 2.98          PTT - ( 22 Mar 2020 23:09 )  PTT:50.9 sec  Urinalysis Basic - ( 23 Mar 2020 00:32 )    Color: Brown / Appearance: Turbid / S.025 / pH: x  Gluc: x / Ketone: Trace mg/dL  / Bili: Moderate / Urobili: 1.0 E.U./dL   Blood: x / Protein: 100 mg/dL / Nitrite: POSITIVE   Leuk Esterase: Small / RBC: > 10 /HPF / WBC 5-10 /HPF   Sq Epi: x / Non Sq Epi: 0-5 /HPF / Bacteria: Present /HPF        RADIOLOGY & ADDITIONAL TESTS:  Reviewed

## 2020-03-24 NOTE — DISCHARGE NOTE FOR THE EXPIRED PATIENT - HOSPITAL COURSE
89 year old male with PMH HTN and dementia who was brought in from [facility] for hypoxia.  The patient is currently sedated and intubated so history obtained from ED provider.  The patient was having 2-3 days of cough with fever of 101, increased work of breathing and hypoxia to the 60s.  He was placed on NRB and brought to the hospital.  Upon arrival to the ED, vital signs were BP 82/52, , RR 12, temperature 101 degrees Farenheit and saturating 100% on NRB.  After labs were drawn patient was intubated.  Labs were significant for hemoglobin 9.7 (decreased to 4.4 on repeat), platelets 158 (repeat 78), INR 2.98, lactate 4.8 (resolved to 1.7), ABG with pH 7.34, CO2 19, PO2 36, HCO3 10, arterial saturation 58, UA positive for nitrite and small leukocyte esterase and 5-10 WBC.  He received zosyn 3.375g, 1g vancomycin, 2L NS, 650mg rectal tylenol.  He was started on sedation which dropped his blood pressures at which point levophed was started. A goals of care conversation was had (please see [facility] note) with RAQUEL Vital, a decision was mutually made to palliatively extubate the pt, morphine drip, and place pt in comfort care measures. Patient  at 22:29 due to cardiopulmonary arrest 2/2 to septic shock 2/2 to COVID-19. 89 year old male with PMH HTN and dementia who was brought in from [facility] for hypoxia.  The patient is currently sedated and intubated so history obtained from ED provider.  The patient was having 2-3 days of cough with fever of 101, increased work of breathing and hypoxia to the 60s.  He was placed on NRB and brought to the hospital.  Upon arrival to the ED, vital signs were BP 82/52, , RR 12, temperature 101 degrees Farenheit and saturating 100% on NRB.  After labs were drawn patient was intubated.  Labs were significant for hemoglobin 9.7 (decreased to 4.4 on repeat), platelets 158 (repeat 78), INR 2.98, lactate 4.8 (resolved to 1.7), ABG with pH 7.34, CO2 19, PO2 36, HCO3 10, arterial saturation 58, UA positive for nitrite and small leukocyte esterase and 5-10 WBC.  He received zosyn 3.375g, 1g vancomycin, 2L NS, 650mg rectal tylenol.  He was started on sedation which dropped his blood pressures at which point levophed was started. A goals of care conversation was had (please see [facility] note) with RAQUEL Vital, a decision was mutually made to palliatively extubate the pt, morphine drip, and place pt in comfort care measures. Patient  at 22:29 due to cardiopulmonary arrest 2/2 to septic shock 2/2 to COVID-19..

## 2020-03-25 PROCEDURE — 87633 RESP VIRUS 12-25 TARGETS: CPT

## 2020-03-25 PROCEDURE — 86850 RBC ANTIBODY SCREEN: CPT

## 2020-03-25 PROCEDURE — 87086 URINE CULTURE/COLONY COUNT: CPT

## 2020-03-25 PROCEDURE — 82550 ASSAY OF CK (CPK): CPT

## 2020-03-25 PROCEDURE — 82803 BLOOD GASES ANY COMBINATION: CPT

## 2020-03-25 PROCEDURE — 81001 URINALYSIS AUTO W/SCOPE: CPT

## 2020-03-25 PROCEDURE — 85025 COMPLETE CBC W/AUTO DIFF WBC: CPT

## 2020-03-25 PROCEDURE — 36415 COLL VENOUS BLD VENIPUNCTURE: CPT

## 2020-03-25 PROCEDURE — 87040 BLOOD CULTURE FOR BACTERIA: CPT

## 2020-03-25 PROCEDURE — 71045 X-RAY EXAM CHEST 1 VIEW: CPT

## 2020-03-25 PROCEDURE — 87798 DETECT AGENT NOS DNA AMP: CPT

## 2020-03-25 PROCEDURE — 31500 INSERT EMERGENCY AIRWAY: CPT

## 2020-03-25 PROCEDURE — 87581 M.PNEUMON DNA AMP PROBE: CPT

## 2020-03-25 PROCEDURE — 99291 CRITICAL CARE FIRST HOUR: CPT | Mod: 25

## 2020-03-25 PROCEDURE — 85610 PROTHROMBIN TIME: CPT

## 2020-03-25 PROCEDURE — 96374 THER/PROPH/DIAG INJ IV PUSH: CPT | Mod: XU

## 2020-03-25 PROCEDURE — 96375 TX/PRO/DX INJ NEW DRUG ADDON: CPT | Mod: XU

## 2020-03-25 PROCEDURE — 87486 CHLMYD PNEUM DNA AMP PROBE: CPT

## 2020-03-25 PROCEDURE — 87635 SARS-COV-2 COVID-19 AMP PRB: CPT

## 2020-03-25 PROCEDURE — 83605 ASSAY OF LACTIC ACID: CPT

## 2020-03-25 PROCEDURE — 85730 THROMBOPLASTIN TIME PARTIAL: CPT

## 2020-03-25 PROCEDURE — 86901 BLOOD TYPING SEROLOGIC RH(D): CPT

## 2020-03-25 PROCEDURE — 86923 COMPATIBILITY TEST ELECTRIC: CPT

## 2020-03-25 PROCEDURE — 84484 ASSAY OF TROPONIN QUANT: CPT

## 2020-03-28 LAB
CULTURE RESULTS: SIGNIFICANT CHANGE UP
CULTURE RESULTS: SIGNIFICANT CHANGE UP
SPECIMEN SOURCE: SIGNIFICANT CHANGE UP
SPECIMEN SOURCE: SIGNIFICANT CHANGE UP

## 2020-04-06 DIAGNOSIS — A41.89 OTHER SPECIFIED SEPSIS: ICD-10-CM

## 2020-04-06 DIAGNOSIS — Z79.82 LONG TERM (CURRENT) USE OF ASPIRIN: ICD-10-CM

## 2020-04-06 DIAGNOSIS — J96.01 ACUTE RESPIRATORY FAILURE WITH HYPOXIA: ICD-10-CM

## 2020-04-06 DIAGNOSIS — Z66 DO NOT RESUSCITATE: ICD-10-CM

## 2020-04-06 DIAGNOSIS — Z51.5 ENCOUNTER FOR PALLIATIVE CARE: ICD-10-CM

## 2020-04-06 DIAGNOSIS — Z74.01 BED CONFINEMENT STATUS: ICD-10-CM

## 2020-04-06 DIAGNOSIS — R64 CACHEXIA: ICD-10-CM

## 2020-04-06 DIAGNOSIS — B97.29 OTHER CORONAVIRUS AS THE CAUSE OF DISEASES CLASSIFIED ELSEWHERE: ICD-10-CM

## 2020-04-06 DIAGNOSIS — F02.80 DEMENTIA IN OTHER DISEASES CLASSIFIED ELSEWHERE, UNSPECIFIED SEVERITY, WITHOUT BEHAVIORAL DISTURBANCE, PSYCHOTIC DISTURBANCE, MOOD DISTURBANCE, AND ANXIETY: ICD-10-CM

## 2020-04-06 DIAGNOSIS — R65.21 SEVERE SEPSIS WITH SEPTIC SHOCK: ICD-10-CM

## 2020-04-06 DIAGNOSIS — J12.89 OTHER VIRAL PNEUMONIA: ICD-10-CM

## 2020-04-06 DIAGNOSIS — G30.9 ALZHEIMER'S DISEASE, UNSPECIFIED: ICD-10-CM

## 2020-04-06 DIAGNOSIS — Z20.818 CONTACT WITH AND (SUSPECTED) EXPOSURE TO OTHER BACTERIAL COMMUNICABLE DISEASES: ICD-10-CM

## 2020-04-06 DIAGNOSIS — I10 ESSENTIAL (PRIMARY) HYPERTENSION: ICD-10-CM

## 2020-10-20 NOTE — H&P ADULT - PSH
Have You Had This Injection Before?: has been previously injected
No significant past surgical history

## 2022-04-04 NOTE — H&P ADULT - PROBLEM SELECTOR PLAN 5
Scheduled date of Colon/EGD (as of today): 09/19/2022  Physician performing: Dr Jaylene Mcmullen  Location of procedure: [facility]  Bowel prep reviewed with patient: Brayden/Dulcolax  Instructions reviewed with patient by:  Jayla  Clearances: n/a
see#1

## 2024-10-15 NOTE — PATIENT PROFILE ADULT - IS THERE A SUSPICION OF ABUSE/NEGLIGENCE?
Rico Padilla, was evaluated through a synchronous (real-time) audio-video encounter. The patient (or guardian if applicable) is aware that this is a billable service, which includes applicable co-pays. This Virtual Visit was conducted with patient's (and/or legal guardian's) consent. Patient identification was verified, and a caregiver was present when appropriate.   The patient was located at Home: [address] [place] [id number]  Provider was located at Home (Appt [place] State[place]  Confirm you are appropriately licensed, registered, or certified to deliver care in the state where the patient is located as indicated above. If you are not or unsure, please re-schedule the visit: Yes, I confirm.     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 does not want to get it removed due to surgical risk was told that he may need to become wheelchair dependent after surgery he may have 38% chance of recovery and he does not like to get surgery done but he likes to get his pain medication and muscle relaxant to be a refill otherwise pain is 7 out of 10 and dull and nonradiating in addition states that she does not like daily general surgeon that was referred to for his hernia repair state that he wanted to cut him open but he disagreed he requesting to have another surgeon referral also stating that he has toothache and usually antibiotic would help him until he gets to see a dentist    Constitutional: no chills and fever,nad     HENT: no ear pain and nosebleeds. No blurred vision,   Respiratory: no shortness of breath, wheezing cough nor sore throat.    Cardiovascular: Has no chest pain, leg swelling ,and racing heart .   Gastrointestinal: No constipation, diarrhea, nausea and vomiting.   Genitourinary: No frequency.   Musculoskeletal: +++for multiple joint  no

## 2025-04-28 NOTE — PATIENT PROFILE ADULT - EQUIPMENT CURRENTLY USED AT HOME
Patient c/o pain, swelling to the right hand and right hip s/p mechanical fall today. Denies head strike. Pmh htn, hld. unable to assess/verbally unresponsive/cognitive impaired